# Patient Record
Sex: MALE | Race: BLACK OR AFRICAN AMERICAN | NOT HISPANIC OR LATINO | URBAN - METROPOLITAN AREA
[De-identification: names, ages, dates, MRNs, and addresses within clinical notes are randomized per-mention and may not be internally consistent; named-entity substitution may affect disease eponyms.]

---

## 2022-04-26 ENCOUNTER — EMERGENCY (EMERGENCY)
Facility: HOSPITAL | Age: 27
LOS: 1 days | Discharge: HOME | End: 2022-04-26
Admitting: EMERGENCY MEDICINE
Payer: SELF-PAY

## 2022-04-26 VITALS
RESPIRATION RATE: 20 BRPM | TEMPERATURE: 97 F | OXYGEN SATURATION: 98 % | SYSTOLIC BLOOD PRESSURE: 114 MMHG | HEART RATE: 74 BPM | DIASTOLIC BLOOD PRESSURE: 75 MMHG

## 2022-04-26 PROCEDURE — 99053 MED SERV 10PM-8AM 24 HR FAC: CPT

## 2022-04-26 PROCEDURE — 99284 EMERGENCY DEPT VISIT MOD MDM: CPT

## 2022-04-26 NOTE — ED PROVIDER NOTE - OBJECTIVE STATEMENT
25 yo M with no known PMHx, BIBA for AMS and public intoxication. Pt admits to alcohol consumption tonight, and [place]  No apparent trauma, bleeding, respiratory distress, N/V, pain, focal weakness, urinary/bowel incontinence or tremors noted. Pt is altered and unable to provide clinical information currently

## 2022-04-26 NOTE — ED ADULT NURSE NOTE - OBJECTIVE STATEMENT
Pt BIBA from train station, admits to alcohol use, denies drug use, pain or injuries. No obvious injuries noted.

## 2022-04-26 NOTE — ED PROVIDER NOTE - PATIENT PORTAL LINK FT
You can access the FollowMyHealth Patient Portal offered by [facility] by registering at the following website: [email or web address]. By joining [employer]’s FollowMyHealth portal, you will also be able to view your health information using other applications (apps) compatible with our system.

## 2022-04-26 NOTE — ED PROVIDER NOTE - NSFOLLOWUPINSTRUCTIONS_ED_ALL_ED_FT
Follow up with your primary care doctor or clinics listed below if you do not have a doctor,    [address] [address]  To make an appointment, call [phone]    [facility]  Address: [facility] [address] [place] [id number]  Appointment Center: [phone] ([phone])     [facility] is a good referral line for crisis and substance abuse help.  [facility] has drop in programs all over the city.    Return to the ER for Emergencies.  Return immediately for any new or worsening symptoms or any new concerns

## 2022-04-26 NOTE — ED PROVIDER NOTE - PHYSICAL EXAMINATION
Gen - WDWN M, +AOB, lethargic but arousable by verbal stimuli  Skin - warm, dry, intact  HEENT - AT/NC, PERRL, mild conjunctival injection, pupils 3mm b/l, TM intact with no hemotympanium b/l, no facial contusion or periorbital ecchymosis, o/p clear, uvula midline, airway patent, neck supple with no step off or midline tenderness, FROM   CV - S1S2, R/R/R  Resp - respiration non-labored, CTAB, symmetric bs b/l, no r/r/w  GI - NABS, soft, ND, NT, no rebound or guarding, no CVAT b/l  MS - moving all extremities, no c/c/e   Neuro - Lethargic but arousable by verbal stimuli, garble speech and unsteady gait

## 2022-04-29 DIAGNOSIS — R41.82 ALTERED MENTAL STATUS, UNSPECIFIED: ICD-10-CM

## 2022-04-29 DIAGNOSIS — F10.129 ALCOHOL ABUSE WITH INTOXICATION, UNSPECIFIED: ICD-10-CM

## 2023-01-16 ENCOUNTER — EMERGENCY (EMERGENCY)
Facility: HOSPITAL | Age: 28
LOS: 1 days | Discharge: HOME | End: 2023-01-16
Attending: EMERGENCY MEDICINE | Admitting: EMERGENCY MEDICINE
Payer: MEDICAID

## 2023-01-16 VITALS
SYSTOLIC BLOOD PRESSURE: 121 MMHG | WEIGHT: 184.97 LBS | RESPIRATION RATE: 18 BRPM | DIASTOLIC BLOOD PRESSURE: 72 MMHG | HEIGHT: 78 IN | TEMPERATURE: 98 F | OXYGEN SATURATION: 99 % | HEART RATE: 94 BPM

## 2023-01-16 LAB — HIV 1 & 2 AB SERPL IA.RAPID: SIGNIFICANT CHANGE UP

## 2023-01-16 PROCEDURE — 73630 X-RAY EXAM OF FOOT: CPT | Mod: 26,LT

## 2023-01-16 PROCEDURE — 99284 EMERGENCY DEPT VISIT MOD MDM: CPT

## 2023-01-16 RX ORDER — TETANUS TOXOID, REDUCED DIPHTHERIA TOXOID AND ACELLULAR PERTUSSIS VACCINE, ADSORBED 5; 2.5; 8; 8; 2.5 [IU]/.5ML; [IU]/.5ML; UG/.5ML; UG/.5ML; UG/.5ML
0.5 SUSPENSION INTRAMUSCULAR ONCE
Refills: 0 | Status: COMPLETED | OUTPATIENT
Start: 2023-01-16 | End: 2023-01-16

## 2023-01-16 RX ORDER — CEPHALEXIN 500 MG
1 CAPSULE ORAL
Qty: 28 | Refills: 0
Start: 2023-01-16 | End: 2023-01-22

## 2023-01-16 RX ORDER — IBUPROFEN 200 MG
1 TABLET ORAL
Qty: 30 | Refills: 0
Start: 2023-01-16

## 2023-01-16 RX ORDER — DOLUTEGRAVIR SODIUM 25 MG/1
1 TABLET, FILM COATED ORAL
Qty: 28 | Refills: 0
Start: 2023-01-16 | End: 2023-02-12

## 2023-01-16 RX ORDER — EMTRICITABINE AND TENOFOVIR DISOPROXIL FUMARATE 200; 300 MG/1; MG/1
1 TABLET, FILM COATED ORAL
Qty: 28 | Refills: 0
Start: 2023-01-16 | End: 2023-02-12

## 2023-01-16 RX ADMIN — TETANUS TOXOID, REDUCED DIPHTHERIA TOXOID AND ACELLULAR PERTUSSIS VACCINE, ADSORBED 0.5 MILLILITER(S): 5; 2.5; 8; 8; 2.5 SUSPENSION INTRAMUSCULAR at 14:20

## 2023-01-16 NOTE — ED PROVIDER NOTE - PROGRESS NOTE DETAILS
X-ray without evidence of radiopaque foreign body  Rapid HIV negative  Postexposure HIV meds sent to pharmacy  Keflex and Doxy sent for coverage of impetigo/possible cellulitis  DC with outpatient follow-up  Discussed indication for patient return to ED.  Patient understood.

## 2023-01-16 NOTE — ED ADULT NURSE NOTE - OBJECTIVE STATEMENT
Pt presents to ED complaining of foot pain secondary to "stepping on a needle" No apparent gait abnormalities. On assessment- AOx4, breathing even and unlabored on RA, no apparent distress, VSS in triage, able to speak in clear coherent sentences, steady gait unassisted, neuro intact with no apparent facial asymmetry, PERRLA.

## 2023-01-16 NOTE — ED ADULT TRIAGE NOTE - SOURCE OF INFORMATION
The risks, benefits and alternatives of refractive surgery have been  discussed to include possible decrease in vision, dry eye, and halos/starbursts around lights Patient

## 2023-01-16 NOTE — ED PROVIDER NOTE - OBJECTIVE STATEMENT
27-year-old male denies past medical history presents for evaluation of left foot injury this morning.  Patient states he might of stepped on a needle on the floor of shelter that punctured through his sock into his foot.  Patient states he removed the needle and does not have a foreign body sensation.  Also complaining of multiple areas of rash to anterior abdominal wall and lower extremities times a few weeks that is itchy and painful discharge.  Denies other acute complaints

## 2023-01-16 NOTE — ED PROVIDER NOTE - PATIENT PORTAL LINK FT
You can access the FollowMyHealth Patient Portal offered by [facility] by registering at the following website: [email or web address]. By joining [employer]’s FollowMyHealth portal, you will also be able to view your health information using other applications (apps) compatible with our system.

## 2023-01-16 NOTE — ED PROVIDER NOTE - PHYSICAL EXAMINATION
GENERAL: well appearing, no acute distress, poor hygiene   HEAD: atraumatic   EYES: EOMI   ENT: moist oral mucosa   CARDIAC: regular rate  RESPIRATORY: no increased work of breathing   MUSCULOSKELETAL: no deformity   NEUROLOGICAL: alert, spontaneous movement of extremities   SKIN: L foot with punctate wound to heel, no foreign bodies; Anterior abdominal wall with painful ulcerations to lower abdomen worse on the right side with serosanguineous discharge, additional similar isolated lesions to bilateral legs and lower back  PSYCHIATRIC: cooperative

## 2023-01-16 NOTE — ED PROVIDER NOTE - CLINICAL SUMMARY MEDICAL DECISION MAKING FREE TEXT BOX
27-year-old male with rash concerning for impetigo versus less likely disseminated shingles infection versus other.  Plan for culture swabs and DC with antibiotics.    Also possible needlestick so will obtain x-ray to evaluate for foreign body and offer patient HIV postexposure prophylaxis pending rapid HIV test today ED

## 2023-01-16 NOTE — ED PROVIDER NOTE - NS ED ROS FT
CONSTITUTIONAL: no fever  EYES: no eye pain   ENMT: no throat pain  CARDIOVASCULAR: no chest pain   RESPIRATORY: no shortness of breath   GASTROINTESTINAL: no abdominal pain   GENITOURINARY: no dysuria   SKIN: +rash   NEUROLOGICAL: no headache

## 2023-01-17 LAB
HSV+VZV DNA SPEC QL NAA+PROBE: SIGNIFICANT CHANGE UP
SPECIMEN SOURCE: SIGNIFICANT CHANGE UP

## 2023-01-18 LAB
-  AMPICILLIN/SULBACTAM: SIGNIFICANT CHANGE UP
-  CEFAZOLIN: SIGNIFICANT CHANGE UP
-  CLINDAMYCIN: SIGNIFICANT CHANGE UP
-  ERYTHROMYCIN: SIGNIFICANT CHANGE UP
-  GENTAMICIN: SIGNIFICANT CHANGE UP
-  OXACILLIN: SIGNIFICANT CHANGE UP
-  RIFAMPIN: SIGNIFICANT CHANGE UP
-  TETRACYCLINE: SIGNIFICANT CHANGE UP
-  TRIMETHOPRIM/SULFAMETHOXAZOLE: SIGNIFICANT CHANGE UP
-  VANCOMYCIN: SIGNIFICANT CHANGE UP
CULTURE RESULTS: SIGNIFICANT CHANGE UP
METHOD TYPE: SIGNIFICANT CHANGE UP
ORGANISM # SPEC MICROSCOPIC CNT: SIGNIFICANT CHANGE UP
ORGANISM # SPEC MICROSCOPIC CNT: SIGNIFICANT CHANGE UP
SPECIMEN SOURCE: SIGNIFICANT CHANGE UP

## 2023-01-19 DIAGNOSIS — R21 RASH AND OTHER NONSPECIFIC SKIN ERUPTION: ICD-10-CM
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
## 2023-01-19 DIAGNOSIS — S91.332A PUNCTURE WOUND WITHOUT FOREIGN BODY, LEFT FOOT, INITIAL ENCOUNTER: ICD-10-CM

## 2023-01-19 DIAGNOSIS — L98.499 NON-PRESSURE CHRONIC ULCER OF SKIN OF OTHER SITES WITH UNSPECIFIED SEVERITY: ICD-10-CM

## 2023-01-19 DIAGNOSIS — Z23 ENCOUNTER FOR IMMUNIZATION: ICD-10-CM
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
## 2023-01-19 DIAGNOSIS — R69 ILLNESS, UNSPECIFIED: ICD-10-CM

## 2023-01-20 NOTE — POST DISCHARGE NOTE - OTHER COMMUNICATION DETAILS:
Patient has positive wound culture (positive for staph.) but patient is homeless and no phone.  However, he was discharged with prescriptions for 2 antibiotics.  Unable to contact patient.

## 2023-01-21 ENCOUNTER — EMERGENCY (EMERGENCY)
Facility: HOSPITAL | Age: 28
LOS: 1 days | Discharge: HOME | End: 2023-01-21
Admitting: EMERGENCY MEDICINE
Payer: MEDICAID

## 2023-01-21 VITALS
HEIGHT: 71 IN | OXYGEN SATURATION: 100 % | WEIGHT: 214.95 LBS | DIASTOLIC BLOOD PRESSURE: 83 MMHG | RESPIRATION RATE: 18 BRPM | SYSTOLIC BLOOD PRESSURE: 136 MMHG | TEMPERATURE: 99 F | HEART RATE: 86 BPM

## 2023-01-21 VITALS
RESPIRATION RATE: 17 BRPM | SYSTOLIC BLOOD PRESSURE: 130 MMHG | DIASTOLIC BLOOD PRESSURE: 80 MMHG | OXYGEN SATURATION: 98 % | HEART RATE: 84 BPM

## 2023-01-21 LAB
ANION GAP SERPL CALC-SCNC: 6 MMOL/L — LOW (ref 9–16)
BASOPHILS # BLD AUTO: 0.04 K/UL — SIGNIFICANT CHANGE UP (ref 0–0.2)
BASOPHILS NFR BLD AUTO: 0.4 % — SIGNIFICANT CHANGE UP (ref 0–2)
BUN SERPL-MCNC: 22 MG/DL — SIGNIFICANT CHANGE UP (ref 7–23)
CALCIUM SERPL-MCNC: 8.7 MG/DL — SIGNIFICANT CHANGE UP (ref 8.5–10.5)
CHLORIDE SERPL-SCNC: 105 MMOL/L — SIGNIFICANT CHANGE UP (ref 96–108)
CO2 SERPL-SCNC: 28 MMOL/L — SIGNIFICANT CHANGE UP (ref 22–31)
CREAT SERPL-MCNC: 1.01 MG/DL — SIGNIFICANT CHANGE UP (ref 0.5–1.3)
EGFR: 104 ML/MIN/1.73M2 — SIGNIFICANT CHANGE UP
EOSINOPHIL # BLD AUTO: 0.14 K/UL — SIGNIFICANT CHANGE UP (ref 0–0.5)
EOSINOPHIL NFR BLD AUTO: 1.4 % — SIGNIFICANT CHANGE UP (ref 0–6)
GLUCOSE SERPL-MCNC: 80 MG/DL — SIGNIFICANT CHANGE UP (ref 70–99)
HCT VFR BLD CALC: 35.9 % — LOW (ref 39–50)
HGB BLD-MCNC: 11.8 G/DL — LOW (ref 13–17)
IMM GRANULOCYTES NFR BLD AUTO: 0.4 % — SIGNIFICANT CHANGE UP (ref 0–0.9)
LYMPHOCYTES # BLD AUTO: 1.22 K/UL — SIGNIFICANT CHANGE UP (ref 1–3.3)
LYMPHOCYTES # BLD AUTO: 12.2 % — LOW (ref 13–44)
MCHC RBC-ENTMCNC: 25.5 PG — LOW (ref 27–34)
MCHC RBC-ENTMCNC: 32.9 GM/DL — SIGNIFICANT CHANGE UP (ref 32–36)
MCV RBC AUTO: 77.5 FL — LOW (ref 80–100)
MONOCYTES # BLD AUTO: 0.8 K/UL — SIGNIFICANT CHANGE UP (ref 0–0.9)
MONOCYTES NFR BLD AUTO: 8 % — SIGNIFICANT CHANGE UP (ref 2–14)
NEUTROPHILS # BLD AUTO: 7.74 K/UL — HIGH (ref 1.8–7.4)
NEUTROPHILS NFR BLD AUTO: 77.6 % — HIGH (ref 43–77)
NRBC # BLD: 0 /100 WBCS — SIGNIFICANT CHANGE UP (ref 0–0)
PLATELET # BLD AUTO: 278 K/UL — SIGNIFICANT CHANGE UP (ref 150–400)
POTASSIUM SERPL-MCNC: 4.6 MMOL/L — SIGNIFICANT CHANGE UP (ref 3.5–5.3)
POTASSIUM SERPL-SCNC: 4.6 MMOL/L — SIGNIFICANT CHANGE UP (ref 3.5–5.3)
RBC # BLD: 4.63 M/UL — SIGNIFICANT CHANGE UP (ref 4.2–5.8)
RBC # FLD: 13.1 % — SIGNIFICANT CHANGE UP (ref 10.3–14.5)
SODIUM SERPL-SCNC: 139 MMOL/L — SIGNIFICANT CHANGE UP (ref 132–145)
WBC # BLD: 9.98 K/UL — SIGNIFICANT CHANGE UP (ref 3.8–10.5)
WBC # FLD AUTO: 9.98 K/UL — SIGNIFICANT CHANGE UP (ref 3.8–10.5)

## 2023-01-21 PROCEDURE — 99284 EMERGENCY DEPT VISIT MOD MDM: CPT

## 2023-01-21 PROCEDURE — 93971 EXTREMITY STUDY: CPT | Mod: 26,LT

## 2023-01-21 RX ORDER — CEPHALEXIN 500 MG
1 CAPSULE ORAL
Qty: 28 | Refills: 0
Start: 2023-01-21 | End: 2023-01-27

## 2023-01-21 RX ORDER — CEPHALEXIN 500 MG
500 CAPSULE ORAL ONCE
Refills: 0 | Status: COMPLETED | OUTPATIENT
Start: 2023-01-21 | End: 2023-01-21

## 2023-01-21 RX ADMIN — Medication 500 MILLIGRAM(S): at 13:56

## 2023-01-21 RX ADMIN — Medication 100 MILLIGRAM(S): at 13:55

## 2023-01-21 NOTE — ED PROVIDER NOTE - CLINICAL SUMMARY MEDICAL DECISION MAKING FREE TEXT BOX
27-year-old male, past medical history of asthma, presenting to the emergency department complaining of left calf and thigh cramping for the past few days, as well as painful itchy rash for the past week. Patient found to have multiple pustules noted on the skin consistent with staph.  Wound culture from a few days ago reviewed confirming staph.  Will give patient p.o. doses Doxy and Keflex and will give patient paper copies of his antibiotics in order for him to fill the prescription. Will also obtain ultrasound of the lower extremity due to calf cramping.  Motrin given for pain relief.  Dispo pending medical work-up. 27-year-old male, past medical history of asthma, presenting to the emergency department complaining of left calf and thigh cramping for the past few days, as well as painful itchy rash for the past week. Patient found to have multiple pustules noted on the skin consistent with staph.  Wound culture from a few days ago reviewed confirming staph [no MRSA].  Will give patient p.o. doses Doxy and Keflex [here] and will give patient paper copies of his antibiotics in order for him to fill the prescription. Will also obtain ultrasound of the lower extremity due to calf cramping.  Motrin given for pain relief.  Dispo pending medical work-up.    ===  neg duplex  Will DC - patient has paper copy of RXs  stable on DC.

## 2023-01-21 NOTE — ED PROVIDER NOTE - NSFOLLOWUPINSTRUCTIONS_ED_ALL_ED_FT
Staph bacteria are normal on your skin and in your nose. They do not usually cause infection. The bacteria can cause an infection if they get inside your body through a break in your skin. Usually, antibiotics are used to kill bacteria.    DISCHARGE INSTRUCTIONS:    Seek care immediately if:     •Your symptoms get worse.      Call your doctor if:   •Your symptoms return after treatment.      •You have questions and concerns about your condition or care.      Medicines: You may need the following:  •Antibiotics may help treat a bacterial infection. You may need to take antibiotics for weeks to months. Take all of your antibiotics until they are finished.      •Take your medicine as directed. Contact your healthcare provider if you think your medicine is not helping or if you have side effects. Tell your provider if you are allergic to any medicine. Keep a list of the medicines, vitamins, and herbs you take. Include the amounts, and when and why you take them. Bring the list or the pill bottles to follow-up visits. Carry your medicine list with you in case of an emergency.      •Wash your hands often. Wash your hands several times each day. Wash after you use the bathroom, change a child's diaper, and before you prepare or eat food. Use soap and water every time. Rub your soapy hands together, lacing your fingers. Wash the front and back of your hands, and in between your fingers. Use the fingers of one hand to scrub under the fingernails of the other hand. Wash for at least 20 seconds. Rinse with warm, running water for several seconds. Then dry your hands with a clean towel or paper towel. Use hand  that contains alcohol if soap and water are not available. Do not touch your eyes, nose, or mouth without washing your hands first.  Handwashing           •Do not touch sores. Do not poke or squeeze sores. This can make the infection go deeper into your tissue.      •Cover infected sores with a bandage. Make sure the sore is completely covered during activities that may cause skin to skin contact with another person. Examples include sports such as wrestling or football. Put an extra bandage on a sore that is draining fluid. This helps keep infected drainage off surfaces that others can touch.      •Do not share personal items with others. This includes washcloths, towels, uniforms, clothes, and razors.      •Do not use public pools, hot tubs, or therapy pools until your infection has healed. Your infected sore can spread the infection to another person through the water.        •Clean surfaces often. Use a disinfecting wipe, a single-use sponge, or a cloth you can wash and reuse. Use disinfecting  if you do not have wipes. You can create a disinfecting  by mixing 1 part bleach with 10 parts water. In the kitchen, clean countertops, cooking surfaces, and the fronts and insides of the microwave and refrigerator. In the bathroom, clean the toilet, the area around the toilet, the sink, the area around the sink, and faucets. Clean surfaces in the person's room, such as a desk or dresser.      •Wash dishes and silverware in a  or in hot water. Do not share unwashed dishes or silverware with anyone.      •Wash used sheets, towels, and clothes with water and laundry detergent. Put dirty laundry in the washer immediately. Put it in a plastic bag if you are not able to wash it immediately. You do no need to wash this laundry separately from other laundry. Use the warmest water possible for the type of clothing. Wash your hands after you touch dirty laundry and before you handle clean laundry. Dry laundry completely in a warm or hot dryer.      Follow up with your doctor within 2 days or as directed: You may be referred to an infectious disease specialist. You may need an exam or more tests to make sure your infection is healing. Write down your questions so you remember to ask them during your visits.

## 2023-01-21 NOTE — ED PROVIDER NOTE - PHYSICAL EXAMINATION
VITAL SIGNS: I have reviewed nursing notes and confirm.  CONSTITUTIONAL: Well-developed; well-nourished; in no acute distress.  SKIN: Multiple crops of pustules w/ overlying scabs noted on the trunk and serina LEs, no surrounding erythema, mildly tender when palpated.  HEAD: Normocephalic; atraumatic.  NECK: Supple; non tender.  CARD: S1, S2 normal; no murmurs, gallops, or rubs. Regular rate and rhythm.  RESP: No wheezes, rales or rhonchi.  ABD: Soft; non-distended; non-tender; no rebound or guarding.  EXT: +|mild L medial thigh and calf ttp, trace serina LE edema [non pitting]; DPI.  NEURO: Alert, oriented. Grossly unremarkable. HANSON, normal tone, no gross motor or sensory changes. Fluent speech.   PSYCH: Cooperative, appropriate. Mood and affect wnl.

## 2023-01-21 NOTE — ED PROVIDER NOTE - NS ED ROS FT
+rash  +leg cramping  Denies fevers, chills, nausea, vomiting, diarrhea, constipation, abdominal pain, urinary symptoms, chest pain, palpitations, shortness of breath, dyspnea on exertion, syncope/near syncope, cough/URI symptoms, headache, weakness, numbness, focal deficits, visual changes, gait or balance changes, dizziness

## 2023-01-21 NOTE — ED PROVIDER NOTE - PATIENT PORTAL LINK FT
You can access the FollowMyHealth Patient Portal offered by [facility] by registering at the following website: [email or web address]. By joining [employer]’s FollowMyHealth portal, you will also be able to view your health information using other applications (apps) compatible with our system.

## 2023-01-21 NOTE — ED ADULT TRIAGE NOTE - CHIEF COMPLAINT QUOTE
Pt walked in c/o of atraumatic left leg pain and cramps x today. Pt also reports being seen in this ED on 1/16 for a rash to the abdomen. Reports continued itching and pain. Pt states he was not able to  his medications from the pharmacy due to insurance issues.

## 2023-01-21 NOTE — ED PROVIDER NOTE - OBJECTIVE STATEMENT
27-year-old male, past medical history of asthma, presenting to the emergency department complaining of left calf and thigh cramping for the past few days, as well as painful itchy rash for the past week.  Patient was seen in the ED 3 days ago for the rash, however he states due to insurance issues, he was unable to  his antibiotic prescriptions.  Patient reports he is currently living in a shelter.  Denies fevers, chills, chest pain, history of DVT or clots, urinary symptoms, or shortness of breath.

## 2023-01-23 DIAGNOSIS — R21 RASH AND OTHER NONSPECIFIC SKIN ERUPTION: ICD-10-CM

## 2023-01-23 DIAGNOSIS — Z59.01 SHELTERED HOMELESSNESS: ICD-10-CM

## 2023-01-23 DIAGNOSIS — M79.662 PAIN IN LEFT LOWER LEG: ICD-10-CM

## 2023-01-23 DIAGNOSIS — Z91.018 ALLERGY TO OTHER FOODS: ICD-10-CM

## 2023-01-23 DIAGNOSIS — J45.909 UNSPECIFIED ASTHMA, UNCOMPLICATED: ICD-10-CM

## 2023-01-23 SDOH — ECONOMIC STABILITY - HOUSING INSECURITY: SHELTERED HOMELESSNESS: Z59.01

## 2023-01-26 ENCOUNTER — EMERGENCY (EMERGENCY)
Facility: HOSPITAL | Age: 28
LOS: 1 days | Discharge: HOME | End: 2023-01-26
Attending: EMERGENCY MEDICINE | Admitting: EMERGENCY MEDICINE
Payer: MEDICAID

## 2023-01-26 VITALS
DIASTOLIC BLOOD PRESSURE: 85 MMHG | OXYGEN SATURATION: 100 % | HEART RATE: 87 BPM | WEIGHT: 214.95 LBS | TEMPERATURE: 99 F | SYSTOLIC BLOOD PRESSURE: 133 MMHG | HEIGHT: 71 IN | RESPIRATION RATE: 18 BRPM

## 2023-01-26 DIAGNOSIS — Z91.018 ALLERGY TO OTHER FOODS: ICD-10-CM

## 2023-01-26 DIAGNOSIS — R21 RASH AND OTHER NONSPECIFIC SKIN ERUPTION: ICD-10-CM

## 2023-01-26 DIAGNOSIS — J45.909 UNSPECIFIED ASTHMA, UNCOMPLICATED: ICD-10-CM

## 2023-01-26 PROCEDURE — 99284 EMERGENCY DEPT VISIT MOD MDM: CPT

## 2023-01-26 RX ORDER — CEPHALEXIN 500 MG
500 CAPSULE ORAL ONCE
Refills: 0 | Status: COMPLETED | OUTPATIENT
Start: 2023-01-26 | End: 2023-01-26

## 2023-01-26 RX ADMIN — Medication 500 MILLIGRAM(S): at 18:23

## 2023-01-26 RX ADMIN — Medication 100 MILLIGRAM(S): at 18:22

## 2023-01-26 NOTE — ED PROVIDER NOTE - PROGRESS NOTE DETAILS
Patient was re-evaluated and doing well. Return precautions and follow up were discussed. Patient verbalized understanding.

## 2023-01-26 NOTE — ED PROVIDER NOTE - OBJECTIVE STATEMENT
Patient is a 27-year-old male with a history of asthma who presents to the ED with a rash.  Patient was seen in the ED twice before, January 16 and January 21.  At those visits he was prescribed Keflex and Doxy for a MRSA skin infection, but patient was unable to  his medications due to insurance issues.  Patient says he will now be able to pick them up tomorrow, but in the meantime his rash has been getting worse and he went to come to the ED for a dose of medication.  He endorses subjective chills, no nausea or vomiting, shortness of breath, chest pain, abdominal pain, dysuria, hematuria, diarrhea.  Rash is painful, mildly itchy, bleeds when scratched.  Is on his right greater than left leg, abdomen, back.  Denies IV drug use or any other substance use. Is undomiciled, staying at a shelter

## 2023-01-26 NOTE — ED PROVIDER NOTE - CLINICAL SUMMARY MEDICAL DECISION MAKING FREE TEXT BOX
Casi - Patient is a 27-year-old male with a history of asthma who presents to the ED with a rash. Concern for MRSA skin infection. will give dose of abx in ED, pt says he can  prior prescription tomorrow

## 2023-01-26 NOTE — ED PROVIDER NOTE - NS ED ROS FT
GENERAL: No fever, + chills  EYES: no vision changes, no discharge.   ENT: no difficulty swallowing or speaking   CARDIAC: no chest pain/pressure, SOB, lower extremity swelling  PULMONARY: no cough, SOB  GI: no abdominal pain, n/v/d  : no dysuria  SKIN: +rashes  NEURO: no headache, lightheadedness, paresthesia  MSK: No joint pain, myalgia, weakness.

## 2023-01-26 NOTE — ED PROVIDER NOTE - ANTIBIOTIC MEDICATION DECIDED AGAINST BECAUSE
We considered giving a new antibiotic prescription; however, we confirmed that he does have antibiotics waiting for him at his pharmacy that he can  tomorrow.

## 2023-01-26 NOTE — ED ADULT TRIAGE NOTE - CHIEF COMPLAINT QUOTE
pt. diagnosed with staph infection 5 days ago but has had complications filling his antibiotics pt. reports infection is worse and draining a lot.

## 2023-01-26 NOTE — ED PROVIDER NOTE - ATTENDING CONTRIBUTION TO CARE
Pt is a pleasant 27 yr old male who was seen and examined with the resident.  Pt is c/o rash.  Was seen here on Jan 16 and 21.  No DVT and bloodwork OK.  Was prescribed doxycycline and Keflex on last ED visit but has not yet started his abx because was transferred to a pharmacy that was too far but he will be able to  his abx tomorrow.  On our exam, no fever; normal pulse.  Skin warm and dry. 2cm crusted lesions scattered over R>L thigh, R abd, and 2 lesions on L back. tender to palpation.  Impression is rash--likely bacterial in etiology.  Will give PO abx now and then d/c patient.   also confirmed that pt can get his abx tomorrow.

## 2023-01-26 NOTE — ED PROVIDER NOTE - PHYSICAL EXAMINATION
Felecia Lance MD  GENERAL: Patient awake alert NAD.  HEENT: NC/AT, Moist mucous membranes, EOMI.  LUNGS: normal work of breathing  EXT: No edema. No calf tenderness.  MSK: No spinal tenderness, no pain with movement, no deformities.  NEURO: A&Ox3. Moving all extremities.  SKIN: Warm and dry. 2cm crusted lesions scattered over R>L thigh, R abd, and 2 lesions on L back. tender to palpation  PSYCH: Normal affect.

## 2023-01-26 NOTE — ED PROVIDER NOTE - PATIENT PORTAL LINK FT
You can access the FollowMyHealth Patient Portal offered by [facility] by registering at the following website: [email or web address]. By joining [employer]’s FollowMyHealth portal, you will also be able to view your health information using other applications (apps) compatible with our system.

## 2023-01-27 NOTE — ED ADULT NURSE NOTE - NS ED NURSE RECORD ANOTHER HT AND WT
Transition of Care Plan  RUR- 17%  DISPOSITION: [facility] [address]; pending medical progression and when medically stable  SLP consulted. Gastroenterology consulted and following. F/U with PCP/Specialist    Transport: S    CM will continue to follow and assist with MAE needs as they arise. Reason for Readmission:   Dysphagia           RUR Score/Risk Level:   17%      PCP: YES First and Last name:  Laura Betancourt .[phone]   Name of Practice: Followed at [address]    Are you a current patient: Yes/No: YES   Approximate date of last visit: Unknown   Can you participate in a virtual visit with your PCP: No    Is a Care Conference indicated:  Not at this time. Did you attend your follow up appointment (s): If not, why not: YES         Resources/supports as identified by patient/family:   Family       Top Challenges facing patient (as identified by patient/family and CM):     Current health challenges    Finances/Medication cost?   [employer] as source of income. No significant financial stressors   Transportation      S  Support system or lack thereof? Family     Living arrangements? [address] [facility]     Self-care/ADLs/Cognition? AOx3. Bed Bound. Requires andry lift to transfer at facility. Wears O2 as needed at facility. Current Advanced Directive/Advance Care Plan:  Full Code/ Has ACP docs. Daughter: Primary Decision Wilver Arambula [phone]           Plan for utilizing home health:  Not at this time, LTC. Patient/family seen: YES       Informed patient/family of BPCI-A Bundle Program. Also advised of potential outreach by Care Transitions Team.    Bundle Payment Care Improvement Beneficiary Letter Delivered to Beneficiary or Representative:YES. Date BCPI -A was given:1/27/23    Care Management Interventions  PCP Verified by CM:  Yes  Palliative Care Criteria Met (RRAT>21 & CHF Dx)?: No  Mode of Transport at Discharge: S  Transition of Yes Care Consult (CM Consult): Discharge Planning  Discharge Durable Medical Equipment: No  Physical Therapy Consult: No  Occupational Therapy Consult: No  Speech Therapy Consult: Yes  Support Systems: Child(baylee), Other Family Member(s)  Confirm Follow Up Transport: Other (see comment) (BLS)  Discharge Location  Patient Expects to be Discharged to[de-identified] Skilled nursing facility    Readmission Assessment  Number of days since last admission?: 1-7 days  [facility]  Who is being interviewed?: Patient, Caregiver  What was the patient's/caregiver's perception as to why they think they needed to return back to the hospital?: Other (Comment)  Did you visit your Primary Care Physician after you left the hospital, before you returned this time?: Yes  Did you see a specialist, such as Cardiac, Pulmonary, Orthopedic Physician, etc. after you left the hospital?: No  Who advised the patient to return to the hospital?: Skilled Unit  Does the patient report anything that got in the way of taking their medications?: No  In our efforts to provide the best possible care to you and others like you, can you think of anything that we could have done to help you after you left the hospital the first time, so that you might not have needed to return so soon?: Additional Community resources available for illness support, Other (Comment)    YEISON Ruff/[facility]  Care Management  3:13 PM         .

## 2023-06-12 ENCOUNTER — EMERGENCY (EMERGENCY)
Facility: HOSPITAL | Age: 28
LOS: 1 days | Discharge: HOME | End: 2023-06-12
Attending: EMERGENCY MEDICINE | Admitting: EMERGENCY MEDICINE
Payer: MEDICAID

## 2023-06-12 VITALS
OXYGEN SATURATION: 97 % | RESPIRATION RATE: 18 BRPM | SYSTOLIC BLOOD PRESSURE: 105 MMHG | DIASTOLIC BLOOD PRESSURE: 78 MMHG | HEART RATE: 79 BPM | TEMPERATURE: 98 F | WEIGHT: 214.95 LBS | HEIGHT: 71 IN

## 2023-06-12 DIAGNOSIS — M79.672 PAIN IN LEFT FOOT: ICD-10-CM

## 2023-06-12 DIAGNOSIS — L73.9 FOLLICULAR DISORDER, UNSPECIFIED: ICD-10-CM

## 2023-06-12 DIAGNOSIS — Z91.010 ALLERGY TO PEANUTS: ICD-10-CM

## 2023-06-12 DIAGNOSIS — L84 CORNS AND CALLOSITIES: ICD-10-CM

## 2023-06-12 DIAGNOSIS — Z59.00 HOMELESSNESS UNSPECIFIED: ICD-10-CM

## 2023-06-12 PROCEDURE — 99283 EMERGENCY DEPT VISIT LOW MDM: CPT

## 2023-06-12 RX ORDER — BACITRACIN ZINC 500 UNIT/G
1 OINTMENT IN PACKET (EA) TOPICAL
Qty: 1 | Refills: 0
Start: 2023-06-12 | End: 2023-06-16

## 2023-06-12 RX ORDER — HYDROCORTISONE 1 %
1 OINTMENT (GRAM) TOPICAL
Qty: 1 | Refills: 0
Start: 2023-06-12 | End: 2023-06-18

## 2023-06-12 RX ORDER — BACITRACIN ZINC 500 UNIT/G
1 OINTMENT IN PACKET (EA) TOPICAL ONCE
Refills: 0 | Status: COMPLETED | OUTPATIENT
Start: 2023-06-12 | End: 2023-06-12

## 2023-06-12 RX ORDER — IBUPROFEN 200 MG
600 TABLET ORAL ONCE
Refills: 0 | Status: COMPLETED | OUTPATIENT
Start: 2023-06-12 | End: 2023-06-12

## 2023-06-12 SDOH — ECONOMIC STABILITY - HOUSING INSECURITY: HOMELESSNESS UNSPECIFIED: Z59.00

## 2023-06-12 NOTE — ED PROVIDER NOTE - LOCATION
Left fifth lateral metatarsal with superficial dried cracked callus.  No surrounding erythema or edema/foot

## 2023-06-12 NOTE — ED ADULT NURSE NOTE - NSFALLUNIVINTERV_ED_ALL_ED
Bed/Stretcher in lowest position, wheels locked, appropriate side rails in place/Call bell, personal items and telephone in reach/Instruct patient to call for assistance before getting out of bed/chair/stretcher/Non-slip footwear applied when patient is off stretcher/Delight to call system/Physically safe environment - no spills, clutter or unnecessary equipment/Purposeful proactive rounding/Room/bathroom lighting operational, light cord in reach

## 2023-06-12 NOTE — ED PROVIDER NOTE - OBJECTIVE STATEMENT
27-year-old male with no significant past medical history, undomiciled, who presents with left foot pain from a callus, and itchy dry skin to the front of the abdomen for several weeks.  Patient has not tried anything for the symptoms.  Denies fever or chills, denies trauma, denies falls.  Denies numbness weakness paresthesias.

## 2023-06-12 NOTE — ED PROVIDER NOTE - NSFOLLOWUPCLINICS_GEN_ALL_ED_FT
[facility]  Podiatry  [address] [place] [address]  Phone: [phone]  Fax:

## 2023-06-12 NOTE — ED PROVIDER NOTE - PATIENT PORTAL LINK FT
You can access the FollowMyHealth Patient Portal offered by Health system by registering at the following website: [email or web address]. By joining [employer]’s FollowMyHealth portal, you will also be able to view your health information using other applications (apps) compatible with our system.

## 2023-06-12 NOTE — ED PROVIDER NOTE - CLINICAL SUMMARY MEDICAL DECISION MAKING FREE TEXT BOX
Patient undomiciled otherwise healthy per patient unknown past medical history who presents with callus to the left foot which I will give podiatry referral otherwise no signs of foot cellulitis or deformity, also with mild folliculitis anterior abdominal wall likely from wearing fitted clothing I discussed this with patient he will apply bacitracin and hydrocortisone and advised to watch for signs of worsening erythema or cellulitis he agrees with the plan

## 2023-06-12 NOTE — ED PROVIDER NOTE - ABDOMINAL EXAM
superficial scab excoriation, to anterior mid abdominal wall, with no vesicles, no induration or oozing, otherwise dry. Consistent with folliculitisNo streaking no erythema/soft/nondistended

## 2023-06-12 NOTE — ED PROVIDER NOTE - NSFOLLOWUPINSTRUCTIONS_ED_ALL_ED_FT
Folliculitis  A normal hair follicle compared to one that is infected. The infected follicle is swollen and contains pus.  Folliculitis is inflammation of the hair follicles. Folliculitis most commonly occurs on the scalp, thighs, legs, back, and buttocks. However, it can occur anywhere on the body.    What are the causes?  This condition may be caused by:  A bacterial infection (common).  A fungal infection.  A viral infection.  Contact with certain chemicals, especially oils and tars.  Shaving or waxing.  Greasy ointments or creams applied to the skin.  Long-lasting folliculitis and folliculitis that keeps coming back may be caused by bacteria. This bacteria can live anywhere on your skin and is often found in the nostrils.    What increases the risk?  You are more likely to develop this condition if you have:  A weakened immune system.  Diabetes.  Obesity.  What are the signs or symptoms?  Symptoms of this condition include:  Redness.  Soreness.  Swelling.  Itching.  Small white or yellow, pus-filled, itchy spots (pustules) that appear over a reddened area. If there is an infection that goes deep into the follicle, these may develop into a boil (furuncle).  A group of closely packed boils (carbuncle). These tend to form in hairy, sweaty areas of the body.  How is this diagnosed?  This condition is diagnosed with a skin exam. To find what is causing the condition, your health care provider may take a sample of one of the pustules or boils for testing in a lab.    How is this treated?  This condition may be treated by:  Applying warm compresses to the affected areas.  Taking an antibiotic medicine or applying an antibiotic medicine to the skin.  Applying or bathing with an antiseptic solution.  Taking an over-the-counter medicine to help with itching.  Having a procedure to drain any pustules or boils. This may be done if a pustule or boil contains a lot of pus or fluid.  Having laser hair removal. This may be done to treat long-lasting folliculitis.  Follow these instructions at home:  Managing pain and swelling    A heating pad for use on the affected area.  If directed, apply heat to the affected area as often as told by your health care provider. Use the heat source that your health care provider recommends, such as a moist heat pack or a heating pad.  Place a towel between your skin and the heat source.  Leave the heat on for 20–30 minutes.  Remove the heat if your skin turns bright red. This is especially important if you are unable to feel pain, heat, or cold. You may have a greater risk of getting burned.  General instructions    If you were prescribed an antibiotic medicine, take it or apply it as told by your health care provider. Do not stop using the antibiotic even if your condition improves.  Check the irritated area every day for signs of infection. Check for:  Redness, swelling, or pain.  Fluid or blood.  Warmth.  Pus or a bad smell.  Do not shave irritated skin.  Take over-the-counter and prescription medicines only as told by your health care provider.  Keep all follow-up visits as told by your health care provider. This is important.  Get help right away if:  You have more redness, swelling, or pain in the affected area.  Red streaks are spreading from the affected area.  You have a fever.  Summary  Folliculitis is inflammation of the hair follicles. Folliculitis most commonly occurs on the scalp, thighs, legs, back, and buttocks.  This condition may be treated by taking an antibiotic medicine or applying an antibiotic medicine to the skin, and applying or bathing with an antiseptic solution.  If you were prescribed an antibiotic medicine, take it or apply it as told by your health care provider. Do not stop using the antibiotic even if your condition improves.  Get help right away if you have new or worsening symptoms.  Keep all follow-up visits as told by your health care provider. This is important.  This information is not intended to replace advice given to you by your health care provider. Make sure you discuss any questions you have with your health care provider.

## 2023-06-13 VITALS
DIASTOLIC BLOOD PRESSURE: 75 MMHG | RESPIRATION RATE: 18 BRPM | SYSTOLIC BLOOD PRESSURE: 111 MMHG | OXYGEN SATURATION: 96 % | TEMPERATURE: 98 F | HEART RATE: 75 BPM

## 2023-06-13 PROBLEM — Z78.9 OTHER SPECIFIED HEALTH STATUS: Chronic | Status: ACTIVE | Noted: 2022-04-26

## 2023-06-13 RX ADMIN — Medication 600 MILLIGRAM(S): at 00:04

## 2023-06-13 RX ADMIN — Medication 1 APPLICATION(S): at 00:05

## 2023-11-21 NOTE — ED ADULT TRIAGE NOTE - TEMPERATURE IN CELSIUS (DEGREES C)
What Type Of Note Output Would You Prefer (Optional)?: Bullet Format What Is The Reason For Today's Visit?: Full Body Skin Examination What Is The Reason For Today's Visit? (Being Monitored For X): the development of new lesions 37.1

## 2023-12-03 ENCOUNTER — EMERGENCY (EMERGENCY)
Facility: HOSPITAL | Age: 28
LOS: 1 days | Discharge: HOME | End: 2023-12-03
Admitting: EMERGENCY MEDICINE
Payer: MEDICAID

## 2023-12-03 VITALS
SYSTOLIC BLOOD PRESSURE: 129 MMHG | RESPIRATION RATE: 15 BRPM | OXYGEN SATURATION: 99 % | DIASTOLIC BLOOD PRESSURE: 75 MMHG | HEART RATE: 92 BPM | TEMPERATURE: 98 F

## 2023-12-03 VITALS
SYSTOLIC BLOOD PRESSURE: 119 MMHG | HEART RATE: 77 BPM | OXYGEN SATURATION: 100 % | TEMPERATURE: 98 F | DIASTOLIC BLOOD PRESSURE: 80 MMHG | RESPIRATION RATE: 16 BRPM

## 2023-12-03 PROBLEM — J45.909 UNSPECIFIED ASTHMA, UNCOMPLICATED: Chronic | Status: ACTIVE | Noted: 2023-01-21

## 2023-12-03 PROCEDURE — 70486 CT MAXILLOFACIAL W/O DYE: CPT | Mod: 26

## 2023-12-03 PROCEDURE — 72125 CT NECK SPINE W/O DYE: CPT | Mod: 26

## 2023-12-03 PROCEDURE — 99284 EMERGENCY DEPT VISIT MOD MDM: CPT

## 2023-12-03 PROCEDURE — 70450 CT HEAD/BRAIN W/O DYE: CPT | Mod: 26

## 2023-12-03 PROCEDURE — 73030 X-RAY EXAM OF SHOULDER: CPT | Mod: 26,LT
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
## 2023-12-03 NOTE — ED ADULT NURSE NOTE - CAS EDP DISCH TYPE
Home O-Z Plasty Text: The defect edges were debeveled with a #15 scalpel blade.  Given the location of the defect, shape of the defect and the proximity to free margins an O-Z plasty (double transposition flap) was deemed most appropriate.  Using a sterile surgical marker, the appropriate transposition flaps were drawn incorporating the defect and placing the expected incisions within the relaxed skin tension lines where possible.    The area thus outlined was incised deep to adipose tissue with a #15 scalpel blade.  The skin margins were undermined to an appropriate distance in all directions utilizing iris scissors.  Hemostasis was achieved with electrocautery.  The flaps were then transposed into place, one clockwise and the other counterclockwise, and anchored with interrupted buried subcutaneous sutures. Jasbir
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
## 2023-12-03 NOTE — ED PROVIDER NOTE - PHYSICAL EXAMINATION
General: well developed, well nourished, no distress  Eye: bilateral: PERRL, EOMI  Ears, Nose, Throat: normal pharynx, TMs normal, membranes moist.  Neck: non-tender, full range of motion, supple.  Negative For: lymphadenopathy (R), lymphadenopathy (L)  Respiratory: CTAB.  Cardiovascular: S1-S2 normal, regular rate, regular rhythm.  Abdomen: normal bowel sounds, non tender, soft.    Genitourinary: Negative For: CVA tenderness  Musculoskeletal: normal gait.  Negative For: back pain, upper, back pain  Extremities: normal range of motion, non-tender.  Negative For: edema (R), edema (L), calf tenderness (R), calf tenderness (L), swelling  Extremity Strength: upper extremities equal bilateral: 5/5, lower extremities equal bilateral: 5/5  Neurologic: alert, oriented to person, oriented to place, oriented to time.    Skin: normal color.  Negative For: rash  Psychiatric: normal affect, normal insight, normal concentration  Bleeding appreciated on the L temporal region.  Head is normocephalic. Pt is neurologically intact, no focal neurologic deficits. GCS = 15. no raccoon eyes/rich signs/hemotympanum noted. No focal tenderness on spinous processes of C-spine.
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
## 2023-12-03 NOTE — ED ADULT NURSE NOTE - NSFALLUNIVINTERV_ED_ALL_ED
Bed/Stretcher in lowest position, wheels locked, appropriate side rails in place/Call bell, personal items and telephone in reach/Instruct patient to call for assistance before getting out of bed/chair/stretcher/Non-slip footwear applied when patient is off stretcher/[place] to call system/Physically safe environment - no spills, clutter or unnecessary equipment/Purposeful proactive rounding/Room/bathroom lighting operational, light cord in reach Bed/Stretcher in lowest position, wheels locked, appropriate side rails in place/Call bell, personal items and telephone in reach/Instruct patient to call for assistance before getting out of bed/chair/stretcher/Non-slip footwear applied when patient is off stretcher/[place] to call system/Physically safe environment - no spills, clutter or unnecessary equipment/Purposeful proactive rounding/Room/bathroom lighting operational, light cord in reach Bed/Stretcher in lowest position, wheels locked, appropriate side rails in place/Call bell, personal items and telephone in reach/Instruct patient to call for assistance before getting out of bed/chair/stretcher/Non-slip footwear applied when patient is off stretcher/[place] to call system/Physically safe environment - no spills, clutter or unnecessary equipment/Purposeful proactive rounding/Room/bathroom lighting operational, light cord in reach
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
## 2023-12-03 NOTE — ED PROVIDER NOTE - NSFOLLOWUPINSTRUCTIONS_ED_ALL_ED_FT
Head Injury    WHAT YOU NEED TO KNOW:    A head injury can include your scalp, face, skull, or brain and range from mild to severe. Effects can appear immediately after the injury or develop later. The effects may last a short time or be permanent. Healthcare providers may want to check your recovery over time. Treatment may change as you recover or develop new health problems from the head injury.    DISCHARGE INSTRUCTIONS:    Call your local emergency number (911 in the [place]), or have someone else call if:   •You cannot be woken.      •You have a seizure.      •You stop responding to others or you faint.      •You have blurry or double vision.      •Your speech becomes slurred or confused.      •You have arm or leg weakness, loss of feeling, or new problems with coordination.      •Your pupils are larger than usual, or one pupil is a different size than the other.      •You have blood or clear fluid coming out of your ears or nose.      Return to the emergency department if:   •You have repeated or forceful vomiting.      •You feel confused.      •Your headache gets worse or becomes severe.      •You or someone caring for you notices that you are harder to wake than usual.      Call your doctor if:   •Your symptoms last longer than 6 weeks after the injury.      •You have questions or concerns about your condition or care.      Medicines:   •Acetaminophen decreases pain and fever. It is available without a doctor's order. Ask how much to take and how often to take it. Follow directions. Read the labels of all other medicines you are using to see if they also contain acetaminophen, or ask your doctor or pharmacist. Acetaminophen can cause liver damage if not taken correctly.      •Take your medicine as directed. Contact your healthcare provider if you think your medicine is not helping or if you have side effects. Tell your provider if you are allergic to any medicine. Keep a list of the medicines, vitamins, and herbs you take. Include the amounts, and when and why you take them. Bring the list or the pill bottles to follow-up visits. Carry your medicine list with you in case of an emergency.      Self-care:   •Rest or do quiet activities. Limit your time watching TV, using the computer, or doing tasks that require a lot of thinking. Slowly return to your normal activities as directed. Do not play sports or do activities that may cause you to get hit in the head. Ask your healthcare provider when you can return to sports.      •Apply ice on your head for 15 to 20 minutes every hour or as directed. Use an ice pack, or put crushed ice in a plastic bag. Cover it with a towel before you apply it to your skin. Ice helps prevent tissue damage and decreases swelling and pain.      •Have someone stay with you for 24 hours , or as directed. This person can monitor you for problems and call for help if needed. When you are awake, the person should ask you a few questions every few hours to see if you are thinking clearly. An example is to ask your name or address.      Prevent another head injury:   •Wear a helmet that fits properly. Do this when you play sports, or ride a bike, scooter, or skateboard. Helmets help decrease your risk for a serious head injury. Talk to your healthcare provider about other ways you can protect yourself if you play sports.      •Wear your seatbelt every time you are in a car. This helps lower your risk for a head injury if you are in a car accident.      Follow up with your doctor as directed: Write down your questions so you remember to ask them during your visits.       © [employer] 1973, 2023

## 2023-12-03 NOTE — ED PROVIDER NOTE - PATIENT PORTAL LINK FT
You can access the FollowMyHealth Patient Portal offered by [facility] by registering at the following website: [email or web address]. By joining [employer]’s FollowMyHealth portal, you will also be able to view your health information using other applications (apps) compatible with our system. You can access the FollowMyHealth Patient Portal offered by [facility] by registering at the following website: [email or web address]. By joining [employer]’s FollowMyHealth portal, you will also be able to view your health information using other applications (apps) compatible with our system. You can access the FollowMyHealth Patient Portal offered by [facility] by registering at the following website: [email or web address]. By joining [employer]’s FollowMyHealth portal, you will also be able to view your health information using other applications (apps) compatible with our system.

## 2023-12-03 NOTE — ED PROCEDURE NOTE - CPROC ED TIME OUT STATEMENT1
“Patient's name, , procedure and correct site were confirmed during the [place] Timeout.” “Patient's name, , procedure and correct site were confirmed during the [place] Timeout.” “Patient's name, , procedure and correct site were confirmed during the [place] Timeout.”
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
## 2023-12-05 DIAGNOSIS — M25.512 PAIN IN LEFT SHOULDER: ICD-10-CM
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
## 2023-12-05 DIAGNOSIS — S01.01XA LACERATION WITHOUT FOREIGN BODY OF SCALP, INITIAL ENCOUNTER: ICD-10-CM

## 2023-12-05 DIAGNOSIS — S09.90XA UNSPECIFIED INJURY OF HEAD, INITIAL ENCOUNTER: ICD-10-CM

## 2023-12-05 DIAGNOSIS — Z91.010 ALLERGY TO PEANUTS: ICD-10-CM

## 2023-12-05 DIAGNOSIS — Z91.018 ALLERGY TO OTHER FOODS: ICD-10-CM

## 2024-08-06 NOTE — ED PROVIDER NOTE - NSICDXPASTSURGICALHX_GEN_ALL_CORE_FT
"Subjective   Patient ID: Julia Mcguire is a 63 y.o. female who presents for Follow-up (3 month).  HPI  Patient is here today for 3 mo follow up     Pt fell into her kitchen counter and fractured a few ribs.     Pt complains that she is really struggling with weight gain and trying to loose weight, increased joint pain.     Pt continues  to have diarrhea, cannot correlated with diet. Patients last colonoscopy was in 2013.     Review of Systems   Constitutional:  Negative for activity change, appetite change, chills and fatigue.   HENT:  Negative for congestion, postnasal drip, sinus pressure, sinus pain and sore throat.    Respiratory:  Negative for cough, shortness of breath and wheezing.    Cardiovascular:  Negative for chest pain and leg swelling.   Gastrointestinal:  Positive for diarrhea. Negative for abdominal distention, nausea and vomiting.   Musculoskeletal:  Positive for arthralgias, back pain and neck pain.   Neurological:  Negative for weakness and numbness.       Objective   /84   Pulse 74   Ht 1.575 m (5' 2\")   Wt 76.2 kg (168 lb)   BMI 30.73 kg/m²     Physical Exam  Constitutional:       General: She is not in acute distress.     Appearance: Normal appearance.   HENT:      Head: Normocephalic.      Nose: Nose normal.      Mouth/Throat:      Pharynx: No oropharyngeal exudate.   Eyes:      General:         Right eye: No discharge.         Left eye: No discharge.      Extraocular Movements: Extraocular movements intact.      Pupils: Pupils are equal, round, and reactive to light.   Cardiovascular:      Rate and Rhythm: Normal rate and regular rhythm.      Heart sounds: No murmur heard.     No gallop.   Pulmonary:      Effort: Pulmonary effort is normal. No respiratory distress.      Breath sounds: Normal breath sounds. No wheezing.   Musculoskeletal:         General: No swelling. Normal range of motion.   Skin:     General: Skin is warm and dry.      Coloration: Skin is not jaundiced. "   Neurological:      General: No focal deficit present.      Mental Status: She is alert and oriented to person, place, and time.      Cranial Nerves: No cranial nerve deficit.   Psychiatric:         Mood and Affect: Mood normal.         Behavior: Behavior normal.       Colon cancer 2013, will order   Mammo 2022, ordered   DEXA --   Pap     Flu shot 2023  COVID received   PNA --   Shingles recommended    RSV recommended     Assessment/Plan   Problem List Items Addressed This Visit       Anxiety    Relevant Medications    FLUoxetine (PROzac) 40 mg capsule    Benign essential hypertension    Relevant Medications    losartan (Cozaar) 50 mg tablet    Cervical myelopathy (Multi)    Relevant Medications    gabapentin (Neurontin) 300 mg capsule    Hypothyroidism    Relevant Medications    levothyroxine (Synthroid, Levoxyl) 88 mcg tablet    Fibromyalgia    Relevant Medications    gabapentin (Neurontin) 300 mg capsule     Other Visit Diagnoses       Arthralgia, unspecified joint    -  Primary    Relevant Orders    Referral to Rheumatology    Colon cancer screening        Relevant Orders    Referral to Gastroenterology    Screening mammogram for breast cancer        Relevant Orders    BI mammo bilateral screening tomosynthesis    Obesity (BMI 30-39.9)        Relevant Medications    buPROPion XL (Wellbutrin XL) 150 mg 24 hr tablet          HTN, HLD  - had cardiac cath   - had echocardiogram, EF 65-70%, impaired relaxation patter of left vetricular diastolic filling, bubble study positive for  right to left shunt  - following with cardiology   - on losartan 50-100mg po daily with hydrochlorothiazide   - on lipitor 80mg po daily      2. Cervical spinal stenosis, chronic radiculopathy   - following with neurosurgery and pain management    - off tramadol, gabapentin      3. Anxiety   - does think prozac has helped   - tried zoloft, wellbutrin, buspar, savella, vistaril    - still having stress with son with intermittent drug use      4. Obesity, bmi 30   - will try wellbutrin 150mg po daily     5. Worsening arthritis   - post estefany with increasing titer, CRISSY panel negative   CRP high, recommend seeing rheum for eval to rule out any progressive auto-immune types of arthritis that would need different treatment   Final diagnoses:   [M79.7] Fibromyalgia   [G95.9] Cervical myelopathy (Multi)   [I10] Benign essential hypertension   [E03.9] Acquired hypothyroidism   [F41.9] Anxiety   [M25.50] Arthralgia, unspecified joint   [Z12.11] Colon cancer screening   [Z12.31] Screening mammogram for breast cancer   [E66.9] Obesity (BMI 30-39.9)      PAST SURGICAL HISTORY:  No significant past surgical history
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
## 2025-02-02 ENCOUNTER — EMERGENCY (EMERGENCY)
Facility: HOSPITAL | Age: 30
LOS: 1 days | Discharge: HOME | End: 2025-02-02
Admitting: EMERGENCY MEDICINE
Payer: MEDICAID

## 2025-02-02 VITALS
RESPIRATION RATE: 18 BRPM | HEART RATE: 80 BPM | DIASTOLIC BLOOD PRESSURE: 70 MMHG | OXYGEN SATURATION: 98 % | SYSTOLIC BLOOD PRESSURE: 117 MMHG | TEMPERATURE: 98 F

## 2025-02-02 VITALS
HEART RATE: 85 BPM | DIASTOLIC BLOOD PRESSURE: 68 MMHG | SYSTOLIC BLOOD PRESSURE: 137 MMHG | OXYGEN SATURATION: 96 % | TEMPERATURE: 98 F | RESPIRATION RATE: 16 BRPM
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
## 2025-02-02 DIAGNOSIS — Z91.018 ALLERGY TO OTHER FOODS: ICD-10-CM

## 2025-02-02 DIAGNOSIS — Z91.010 ALLERGY TO PEANUTS: ICD-10-CM

## 2025-02-02 DIAGNOSIS — S51.041A: ICD-10-CM
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
## 2025-02-02 LAB
ALBUMIN SERPL ELPH-MCNC: 3.8 G/DL — SIGNIFICANT CHANGE UP (ref 3.4–5)
ALP SERPL-CCNC: 76 U/L — SIGNIFICANT CHANGE UP (ref 40–120)
ALT FLD-CCNC: 27 U/L — SIGNIFICANT CHANGE UP (ref 12–42)
ANION GAP SERPL CALC-SCNC: 8 MMOL/L — LOW (ref 9–16)
AST SERPL-CCNC: 27 U/L — SIGNIFICANT CHANGE UP (ref 15–37)
BASOPHILS # BLD AUTO: 0.06 K/UL — SIGNIFICANT CHANGE UP (ref 0–0.2)
BASOPHILS NFR BLD AUTO: 1.4 % — SIGNIFICANT CHANGE UP (ref 0–2)
BILIRUB SERPL-MCNC: 0.2 MG/DL — SIGNIFICANT CHANGE UP (ref 0.2–1.2)
BUN SERPL-MCNC: 18 MG/DL — SIGNIFICANT CHANGE UP (ref 7–23)
CALCIUM SERPL-MCNC: 9.3 MG/DL — SIGNIFICANT CHANGE UP (ref 8.5–10.5)
CHLORIDE SERPL-SCNC: 106 MMOL/L — SIGNIFICANT CHANGE UP (ref 96–108)
CO2 SERPL-SCNC: 29 MMOL/L — SIGNIFICANT CHANGE UP (ref 22–31)
CREAT SERPL-MCNC: 1.08 MG/DL — SIGNIFICANT CHANGE UP (ref 0.5–1.3)
EGFR: 95 ML/MIN/1.73M2 — SIGNIFICANT CHANGE UP
EOSINOPHIL # BLD AUTO: 0.16 K/UL — SIGNIFICANT CHANGE UP (ref 0–0.5)
EOSINOPHIL NFR BLD AUTO: 3.7 % — SIGNIFICANT CHANGE UP (ref 0–6)
GLUCOSE SERPL-MCNC: 93 MG/DL — SIGNIFICANT CHANGE UP (ref 70–99)
HCT VFR BLD CALC: 41.2 % — SIGNIFICANT CHANGE UP (ref 39–50)
HGB BLD-MCNC: 13.7 G/DL — SIGNIFICANT CHANGE UP (ref 13–17)
HIV 1 & 2 AB SERPL IA.RAPID: SIGNIFICANT CHANGE UP
IMM GRANULOCYTES NFR BLD AUTO: 0.2 % — SIGNIFICANT CHANGE UP (ref 0–0.9)
LYMPHOCYTES # BLD AUTO: 1.65 K/UL — SIGNIFICANT CHANGE UP (ref 1–3.3)
LYMPHOCYTES # BLD AUTO: 38.3 % — SIGNIFICANT CHANGE UP (ref 13–44)
MCHC RBC-ENTMCNC: 25.5 PG — LOW (ref 27–34)
MCHC RBC-ENTMCNC: 33.3 G/DL — SIGNIFICANT CHANGE UP (ref 32–36)
MCV RBC AUTO: 76.7 FL — LOW (ref 80–100)
MONOCYTES # BLD AUTO: 0.39 K/UL — SIGNIFICANT CHANGE UP (ref 0–0.9)
MONOCYTES NFR BLD AUTO: 9 % — SIGNIFICANT CHANGE UP (ref 2–14)
NEUTROPHILS # BLD AUTO: 2.04 K/UL — SIGNIFICANT CHANGE UP (ref 1.8–7.4)
NEUTROPHILS NFR BLD AUTO: 47.4 % — SIGNIFICANT CHANGE UP (ref 43–77)
NRBC # BLD: 0 /100 WBCS — SIGNIFICANT CHANGE UP (ref 0–0)
NRBC BLD-RTO: 0 /100 WBCS — SIGNIFICANT CHANGE UP (ref 0–0)
PLATELET # BLD AUTO: 264 K/UL — SIGNIFICANT CHANGE UP (ref 150–400)
POTASSIUM SERPL-MCNC: 4.5 MMOL/L — SIGNIFICANT CHANGE UP (ref 3.5–5.3)
POTASSIUM SERPL-SCNC: 4.5 MMOL/L — SIGNIFICANT CHANGE UP (ref 3.5–5.3)
PROT SERPL-MCNC: 7.2 G/DL — SIGNIFICANT CHANGE UP (ref 6.4–8.2)
RBC # BLD: 5.37 M/UL — SIGNIFICANT CHANGE UP (ref 4.2–5.8)
RBC # FLD: 13.2 % — SIGNIFICANT CHANGE UP (ref 10.3–14.5)
SODIUM SERPL-SCNC: 143 MMOL/L — SIGNIFICANT CHANGE UP (ref 132–145)
WBC # BLD: 4.31 K/UL — SIGNIFICANT CHANGE UP (ref 3.8–10.5)
WBC # FLD AUTO: 4.31 K/UL — SIGNIFICANT CHANGE UP (ref 3.8–10.5)

## 2025-02-02 PROCEDURE — 99284 EMERGENCY DEPT VISIT MOD MDM: CPT

## 2025-02-02 RX ORDER — EMTRICITABINE AND TENOFOVIR DISOPROXIL FUMARATE 200; 300 MG/1; MG/1
1 TABLET, FILM COATED ORAL
Qty: 30 | Refills: 0
Start: 2025-02-02 | End: 2025-03-03

## 2025-02-02 RX ORDER — RALTEGRAVIR 400 MG/1
1 TABLET, FILM COATED ORAL
Qty: 60 | Refills: 0
Start: 2025-02-02 | End: 2025-03-03

## 2025-02-02 NOTE — ED PROVIDER NOTE - CARE PROVIDER_API CALL
Jess Love.  Internal Medicine  [address] [address]  Phone: [phone]  Fax: [phone]  Follow Up Time:

## 2025-02-02 NOTE — ED PROVIDER NOTE - PATIENT PORTAL LINK FT
You can access the FollowMyHealth Patient Portal offered by [facility] by registering at the following website: [email or web address]. By joining [employer]’s FollowMyHealth portal, you will also be able to view your health information using other applications (apps) compatible with our system.
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
## 2025-02-02 NOTE — ED PROVIDER NOTE - CLINICAL SUMMARY MEDICAL DECISION MAKING FREE TEXT BOX
29-year-old male presents complaining of being stuck by a needle to the right elbow that occurred about an hour ago.  Patient reports it looks like it was a needle used for drugs.  He states he was sitting on a staircase when he noticed this.  He denies injecting drugs.  Tetanus up-to-date.  Patient would like HIV postexposure prophylaxis.  He washed his right elbow with soap and water in the ER.    labs ordered including rapid HIV, anticipate starting PEP if HIV neg, patient to follow up with outpatient.

## 2025-02-02 NOTE — ED PROVIDER NOTE - PHYSICAL EXAMINATION
CONSTITUTIONAL: Well-appearing; well-nourished; in no apparent distress.   	HEAD: Normocephalic; atraumatic.   	RUE: +puncture wound to right elbow. nontender. no fb. good ROM joints, dpi, soft compartments.   	SKIN: Normal for age and race; warm; dry; good turgor; no apparent lesions or rash.   	NEURO: A & O x 3; face symmetric; grossly unremarkable.   PSYCHOLOGICAL: The patient’s mood and manner are appropriate.

## 2025-02-02 NOTE — ED PROVIDER NOTE - OBJECTIVE STATEMENT
29-year-old male presents complaining of being stuck by a needle to the right elbow that occurred about an hour ago.  Patient reports it looks like it was a needle used for drugs.  He states he was sitting on a staircase when he noticed this.  He denies injecting drugs.  Tetanus up-to-date.  Patient would like HIV postexposure prophylaxis.  He washed his right elbow with soap and water in the ER.

## 2025-02-02 NOTE — ED PROVIDER NOTE - NSFOLLOWUPINSTRUCTIONS_ED_ALL_ED_FT
Please take medications as prescribed.  Keep wound clean and dry.  It is important to follow-up for repeat testing, you have been provided with follow-up information for Dr. Love, please call to make an appointment.    Please return to the emergency room for signs of infection such as redness, swelling, severe pain, fever or drainage to the affected area or any concerns.

## 2025-04-26 ENCOUNTER — EMERGENCY (EMERGENCY)
Facility: HOSPITAL | Age: 30
LOS: 1 days | End: 2025-04-26
Attending: EMERGENCY MEDICINE | Admitting: EMERGENCY MEDICINE
Payer: MEDICAID

## 2025-04-26 VITALS
SYSTOLIC BLOOD PRESSURE: 143 MMHG | TEMPERATURE: 98 F | DIASTOLIC BLOOD PRESSURE: 72 MMHG | HEART RATE: 84 BPM | OXYGEN SATURATION: 98 % | RESPIRATION RATE: 16 BRPM | WEIGHT: 212.08 LBS

## 2025-04-26 VITALS
OXYGEN SATURATION: 95 % | RESPIRATION RATE: 16 BRPM | HEART RATE: 80 BPM | SYSTOLIC BLOOD PRESSURE: 138 MMHG | DIASTOLIC BLOOD PRESSURE: 77 MMHG

## 2025-04-26 LAB
FLUAV AG NPH QL: SIGNIFICANT CHANGE UP
FLUBV AG NPH QL: SIGNIFICANT CHANGE UP
RSV RNA NPH QL NAA+NON-PROBE: SIGNIFICANT CHANGE UP
SARS-COV-2 RNA SPEC QL NAA+PROBE: SIGNIFICANT CHANGE UP
SOURCE RESPIRATORY: SIGNIFICANT CHANGE UP

## 2025-04-26 PROCEDURE — 71046 X-RAY EXAM CHEST 2 VIEWS: CPT | Mod: 26

## 2025-04-26 PROCEDURE — 99284 EMERGENCY DEPT VISIT MOD MDM: CPT

## 2025-04-26 RX ORDER — AZITHROMYCIN 250 MG
500 CAPSULE ORAL ONCE
Refills: 0 | Status: COMPLETED | OUTPATIENT
Start: 2025-04-26 | End: 2025-04-26

## 2025-04-26 RX ADMIN — Medication 500 MILLIGRAM(S): at 18:07

## 2025-04-26 NOTE — ED PROVIDER NOTE - PATIENT PORTAL LINK FT
You can access the FollowMyHealth Patient Portal offered by [facility] by registering at the following website: [email or web address]. By joining [employer]’s FollowMyHealth portal, you will also be able to view your health information using other applications (apps) compatible with our system.

## 2025-04-26 NOTE — ED ADULT TRIAGE NOTE - CHIEF COMPLAINT QUOTE
Pt BIBA from street with AMS. Pt states he though he was smoking meth but believes it contained fentanyl. Was found unresponsive, given 4mg IN Narcan on scene. Responsive to voice. Pt also C/O cough stating "I think I have pneumonia".

## 2025-04-26 NOTE — ED ADULT NURSE NOTE - NSFALLRISKINTERV_ED_ALL_ED
Assistance OOB with selected safe patient handling equipment if applicable/Assistance with ambulation/Communicate fall risk and risk factors to all staff, patient, and family/Monitor gait and stability/Provide visual cue: yellow wristband, yellow gown, etc/Reinforce activity limits and safety measures with patient and family/Call bell, personal items and telephone in reach/Instruct patient to call for assistance before getting out of bed/chair/stretcher/Non-slip footwear applied when patient is off stretcher/[place] to call system/Physically safe environment - no spills, clutter or unnecessary equipment/Purposeful Proactive Rounding/Room/bathroom lighting operational, light cord in reach

## 2025-04-26 NOTE — ED PROVIDER NOTE - PHYSICAL EXAMINATION
VITAL SIGNS: I have reviewed nursing notes and confirm.  CONSTITUTIONAL: Well-developed; well-nourished; in no acute distress.  HEAD: Normocephalic; atraumatic.  EYES: EOM intact; conjunctiva and sclera clear.  ENT: nose appears normal  NECK: Supple  CARD: S1, S2 normal; no murmurs, gallops, or rubs. Regular rate and rhythm.  RESP: No wheezes, rales or rhonchi.  EXT: Normal ROM. No clubbing, cyanosis or edema.  PSYCH: Cooperative, appropriate.

## 2025-04-26 NOTE — ED PROVIDER NOTE - CLINICAL SUMMARY MEDICAL DECISION MAKING FREE TEXT BOX
Celeste Rudd presents with respiratory symptoms concerning for possible pneumonia, complicated by recent opioid use and a history of smoking. The differential diagnosis includes pneumonia, acute bronchitis, opioid-induced respiratory depression, and potential COVID-19 or influenza infection. EMS reports the patient was given a dose of intranasal Narcan. Given the patient's history of pneumonia and current symptoms, pneumonia is high on the differential. However, the recent opioid use and subsequent blackout episode raise concerns about potential opioid-related complications.    Plan:  1. Close monitoring of the patient's respiratory status and vital signs.  2. Chest X-ray to evaluate for signs of pneumonia or other lung pathologies.  3. Swab test for influenza and COVID-19 to rule out these infections.  4. Assess opioid use and consider naloxone if signs of opioid toxicity develop.  5. Provide supportive care as needed, including oxygen therapy if indicated.  6. Re-evaluate the patient after test results are available to determine the need for antibiotics or other interventions.    I will return to update the patient once the chest X-ray and swab test results are available. The patient should be monitored closely for any worsening of respiratory symptoms or signs of opioid toxicity.

## 2025-04-26 NOTE — ED PROVIDER NOTE - OBJECTIVE STATEMENT
Celeste Rudd presents to the Emergency Room with concerns of pneumonia. The patient reports symptoms including coughing, darkened mucus, and shallower breath. These symptoms are similar to those experienced during a previous pneumonia episode in September of last year, which resulted in a two-week hospitalization at [facility]. The patient is a smoker, which may contribute to respiratory symptoms. Today, the patient used an opioid called Paleol, though the method of administration was not specified. Following the opioid use, the patient experienced a blackout episode and does not remember standing up. The patient denies experiencing heavy chest pain at this time.

## 2025-04-27 RX ORDER — AZITHROMYCIN 250 MG
1 CAPSULE ORAL
Qty: 4 | Refills: 0
Start: 2025-04-27 | End: 2025-04-30

## 2025-04-28 DIAGNOSIS — Z91.010 ALLERGY TO PEANUTS: ICD-10-CM

## 2025-04-28 DIAGNOSIS — R05.1 ACUTE COUGH: ICD-10-CM

## 2025-04-28 DIAGNOSIS — Z91.018 ALLERGY TO OTHER FOODS: ICD-10-CM

## 2025-04-28 DIAGNOSIS — T40.601A POISONING BY UNSPECIFIED NARCOTICS, ACCIDENTAL (UNINTENTIONAL), INITIAL ENCOUNTER: ICD-10-CM

## 2025-05-27 ENCOUNTER — INPATIENT (INPATIENT)
Facility: HOSPITAL | Age: 30
LOS: 7 days | Discharge: HOME | DRG: 326 | End: 2025-06-04
Attending: STUDENT IN AN ORGANIZED HEALTH CARE EDUCATION/TRAINING PROGRAM | Admitting: STUDENT IN AN ORGANIZED HEALTH CARE EDUCATION/TRAINING PROGRAM
Payer: COMMERCIAL

## 2025-05-27 ENCOUNTER — EMERGENCY (EMERGENCY)
Facility: HOSPITAL | Age: 30
LOS: 1 days | End: 2025-05-27
Attending: EMERGENCY MEDICINE | Admitting: EMERGENCY MEDICINE
Payer: MEDICAID

## 2025-05-27 VITALS
OXYGEN SATURATION: 98 % | HEART RATE: 60 BPM | RESPIRATION RATE: 17 BRPM | HEIGHT: 72 IN | DIASTOLIC BLOOD PRESSURE: 65 MMHG | SYSTOLIC BLOOD PRESSURE: 133 MMHG | TEMPERATURE: 98 F | WEIGHT: 231.49 LBS

## 2025-05-27 VITALS
HEART RATE: 95 BPM | TEMPERATURE: 99 F | SYSTOLIC BLOOD PRESSURE: 147 MMHG | RESPIRATION RATE: 17 BRPM | DIASTOLIC BLOOD PRESSURE: 80 MMHG | OXYGEN SATURATION: 99 %

## 2025-05-27 VITALS
OXYGEN SATURATION: 97 % | RESPIRATION RATE: 17 BRPM | HEART RATE: 86 BPM | SYSTOLIC BLOOD PRESSURE: 131 MMHG | HEIGHT: 72 IN | WEIGHT: 214.95 LBS | TEMPERATURE: 99 F | DIASTOLIC BLOOD PRESSURE: 84 MMHG

## 2025-05-27 DIAGNOSIS — K66.8 OTHER SPECIFIED DISORDERS OF PERITONEUM: ICD-10-CM

## 2025-05-27 DIAGNOSIS — Z91.018 ALLERGY TO OTHER FOODS: ICD-10-CM

## 2025-05-27 DIAGNOSIS — Z91.010 ALLERGY TO PEANUTS: ICD-10-CM

## 2025-05-27 LAB
ALBUMIN SERPL ELPH-MCNC: 3.8 G/DL — SIGNIFICANT CHANGE UP (ref 3.4–5)
ALP SERPL-CCNC: 72 U/L — SIGNIFICANT CHANGE UP (ref 40–120)
ALT FLD-CCNC: 21 U/L — SIGNIFICANT CHANGE UP (ref 12–42)
ANION GAP SERPL CALC-SCNC: 10 MMOL/L — SIGNIFICANT CHANGE UP (ref 5–17)
ANION GAP SERPL CALC-SCNC: 7 MMOL/L — LOW (ref 9–16)
APTT BLD: 27.9 SEC — SIGNIFICANT CHANGE UP (ref 26.1–36.8)
APTT BLD: 29.1 SEC — SIGNIFICANT CHANGE UP (ref 26.1–36.8)
AST SERPL-CCNC: 16 U/L — SIGNIFICANT CHANGE UP (ref 15–37)
BASOPHILS # BLD AUTO: 0.03 K/UL — SIGNIFICANT CHANGE UP (ref 0–0.2)
BASOPHILS # BLD AUTO: 0.03 K/UL — SIGNIFICANT CHANGE UP (ref 0–0.2)
BASOPHILS NFR BLD AUTO: 0.3 % — SIGNIFICANT CHANGE UP (ref 0–2)
BASOPHILS NFR BLD AUTO: 0.3 % — SIGNIFICANT CHANGE UP (ref 0–2)
BILIRUB SERPL-MCNC: 0.4 MG/DL — SIGNIFICANT CHANGE UP (ref 0.2–1.2)
BLD GP AB SCN SERPL QL: NEGATIVE — SIGNIFICANT CHANGE UP
BUN SERPL-MCNC: 10 MG/DL — SIGNIFICANT CHANGE UP (ref 7–23)
BUN SERPL-MCNC: 13 MG/DL — SIGNIFICANT CHANGE UP (ref 7–23)
CALCIUM SERPL-MCNC: 8.6 MG/DL — SIGNIFICANT CHANGE UP (ref 8.4–10.5)
CALCIUM SERPL-MCNC: 9.2 MG/DL — SIGNIFICANT CHANGE UP (ref 8.5–10.5)
CHLORIDE SERPL-SCNC: 104 MMOL/L — SIGNIFICANT CHANGE UP (ref 96–108)
CHLORIDE SERPL-SCNC: 107 MMOL/L — SIGNIFICANT CHANGE UP (ref 96–108)
CO2 SERPL-SCNC: 21 MMOL/L — LOW (ref 22–31)
CO2 SERPL-SCNC: 29 MMOL/L — SIGNIFICANT CHANGE UP (ref 22–31)
CREAT SERPL-MCNC: 0.8 MG/DL — SIGNIFICANT CHANGE UP (ref 0.5–1.3)
CREAT SERPL-MCNC: 0.95 MG/DL — SIGNIFICANT CHANGE UP (ref 0.5–1.3)
EGFR: 110 ML/MIN/1.73M2 — SIGNIFICANT CHANGE UP
EGFR: 110 ML/MIN/1.73M2 — SIGNIFICANT CHANGE UP
EGFR: 122 ML/MIN/1.73M2 — SIGNIFICANT CHANGE UP
EGFR: 122 ML/MIN/1.73M2 — SIGNIFICANT CHANGE UP
EOSINOPHIL # BLD AUTO: 0 K/UL — SIGNIFICANT CHANGE UP (ref 0–0.5)
EOSINOPHIL # BLD AUTO: 0.08 K/UL — SIGNIFICANT CHANGE UP (ref 0–0.5)
EOSINOPHIL NFR BLD AUTO: 0 % — SIGNIFICANT CHANGE UP (ref 0–6)
EOSINOPHIL NFR BLD AUTO: 0.8 % — SIGNIFICANT CHANGE UP (ref 0–6)
ETHANOL SERPL-MCNC: <3 MG/DL — SIGNIFICANT CHANGE UP
GLUCOSE SERPL-MCNC: 119 MG/DL — HIGH (ref 70–99)
GLUCOSE SERPL-MCNC: 90 MG/DL — SIGNIFICANT CHANGE UP (ref 70–99)
HCT VFR BLD CALC: 40.9 % — SIGNIFICANT CHANGE UP (ref 39–50)
HCT VFR BLD CALC: 47.1 % — SIGNIFICANT CHANGE UP (ref 39–50)
HGB BLD-MCNC: 14 G/DL — SIGNIFICANT CHANGE UP (ref 13–17)
HGB BLD-MCNC: 15.3 G/DL — SIGNIFICANT CHANGE UP (ref 13–17)
IMM GRANULOCYTES # BLD AUTO: 0.02 K/UL — SIGNIFICANT CHANGE UP (ref 0–0.07)
IMM GRANULOCYTES NFR BLD AUTO: 0.2 % — SIGNIFICANT CHANGE UP (ref 0–0.9)
IMM GRANULOCYTES NFR BLD AUTO: 0.2 % — SIGNIFICANT CHANGE UP (ref 0–0.9)
INR BLD: 1 — SIGNIFICANT CHANGE UP (ref 0.85–1.16)
INR BLD: 1.02 — SIGNIFICANT CHANGE UP (ref 0.85–1.16)
LACTATE BLDV-MCNC: 0.8 MMOL/L — SIGNIFICANT CHANGE UP (ref 0.5–2)
LIDOCAIN IGE QN: 26 U/L — SIGNIFICANT CHANGE UP (ref 16–77)
LYMPHOCYTES # BLD AUTO: 0.78 K/UL — LOW (ref 1–3.3)
LYMPHOCYTES # BLD AUTO: 0.8 K/UL — LOW (ref 1–3.3)
LYMPHOCYTES # BLD AUTO: 7.4 % — LOW (ref 13–44)
LYMPHOCYTES NFR BLD AUTO: 7.9 % — LOW (ref 13–44)
MCHC RBC-ENTMCNC: 25 PG — LOW (ref 27–34)
MCHC RBC-ENTMCNC: 25.9 PG — LOW (ref 27–34)
MCHC RBC-ENTMCNC: 32.5 G/DL — SIGNIFICANT CHANGE UP (ref 32–36)
MCHC RBC-ENTMCNC: 34.2 G/DL — SIGNIFICANT CHANGE UP (ref 32–36)
MCV RBC AUTO: 75.7 FL — LOW (ref 80–100)
MCV RBC AUTO: 76.8 FL — LOW (ref 80–100)
MONOCYTES # BLD AUTO: 0.48 K/UL — SIGNIFICANT CHANGE UP (ref 0–0.9)
MONOCYTES # BLD AUTO: 0.73 K/UL — SIGNIFICANT CHANGE UP (ref 0–0.9)
MONOCYTES NFR BLD AUTO: 4.7 % — SIGNIFICANT CHANGE UP (ref 2–14)
MONOCYTES NFR BLD AUTO: 6.9 % — SIGNIFICANT CHANGE UP (ref 2–14)
NEUTROPHILS # BLD AUTO: 8.73 K/UL — HIGH (ref 1.8–7.4)
NEUTROPHILS # BLD AUTO: 9.02 K/UL — HIGH (ref 1.8–7.4)
NEUTROPHILS NFR BLD AUTO: 85.2 % — HIGH (ref 43–77)
NEUTROPHILS NFR BLD AUTO: 86.1 % — HIGH (ref 43–77)
NRBC # BLD AUTO: 0 K/UL — SIGNIFICANT CHANGE UP (ref 0–0)
NRBC # FLD: 0 K/UL — SIGNIFICANT CHANGE UP (ref 0–0)
NRBC BLD AUTO-RTO: 0 /100 WBCS — SIGNIFICANT CHANGE UP (ref 0–0)
NRBC BLD AUTO-RTO: 0 /100 WBCS — SIGNIFICANT CHANGE UP (ref 0–0)
PCP SPEC-MCNC: SIGNIFICANT CHANGE UP
PLATELET # BLD AUTO: 236 K/UL — SIGNIFICANT CHANGE UP (ref 150–400)
PLATELET # BLD AUTO: 264 K/UL — SIGNIFICANT CHANGE UP (ref 150–400)
PMV BLD: 9.5 FL — SIGNIFICANT CHANGE UP (ref 7–13)
POTASSIUM SERPL-MCNC: 4.1 MMOL/L — SIGNIFICANT CHANGE UP (ref 3.5–5.3)
POTASSIUM SERPL-MCNC: 4.2 MMOL/L — SIGNIFICANT CHANGE UP (ref 3.5–5.3)
POTASSIUM SERPL-SCNC: 4.1 MMOL/L — SIGNIFICANT CHANGE UP (ref 3.5–5.3)
POTASSIUM SERPL-SCNC: 4.2 MMOL/L — SIGNIFICANT CHANGE UP (ref 3.5–5.3)
PROT SERPL-MCNC: 7.4 G/DL — SIGNIFICANT CHANGE UP (ref 6.4–8.2)
PROTHROM AB SERPL-ACNC: 11.7 SEC — SIGNIFICANT CHANGE UP (ref 9.9–13.4)
PROTHROM AB SERPL-ACNC: 11.7 SEC — SIGNIFICANT CHANGE UP (ref 9.9–13.4)
RBC # BLD: 5.4 M/UL — SIGNIFICANT CHANGE UP (ref 4.2–5.8)
RBC # BLD: 6.13 M/UL — HIGH (ref 4.2–5.8)
RBC # FLD: 13.7 % — SIGNIFICANT CHANGE UP (ref 10.3–14.5)
RBC # FLD: 13.9 % — SIGNIFICANT CHANGE UP (ref 10.3–14.5)
RH IG SCN BLD-IMP: POSITIVE — SIGNIFICANT CHANGE UP
RH IG SCN BLD-IMP: POSITIVE — SIGNIFICANT CHANGE UP
SODIUM SERPL-SCNC: 138 MMOL/L — SIGNIFICANT CHANGE UP (ref 135–145)
SODIUM SERPL-SCNC: 140 MMOL/L — SIGNIFICANT CHANGE UP (ref 132–145)
WBC # BLD: 10.14 K/UL — SIGNIFICANT CHANGE UP (ref 3.8–10.5)
WBC # BLD: 10.58 K/UL — HIGH (ref 3.8–10.5)
WBC # FLD AUTO: 10.14 K/UL — SIGNIFICANT CHANGE UP (ref 3.8–10.5)
WBC # FLD AUTO: 10.58 K/UL — HIGH (ref 3.8–10.5)

## 2025-05-27 PROCEDURE — 99285 EMERGENCY DEPT VISIT HI MDM: CPT

## 2025-05-27 PROCEDURE — 49905 OMENTAL FLAP INTRA-ABDOM: CPT | Mod: 59

## 2025-05-27 PROCEDURE — 88305 TISSUE EXAM BY PATHOLOGIST: CPT | Mod: 26

## 2025-05-27 PROCEDURE — 43840 GSTRRPHY SUTR DUOL/GSTR ULCR: CPT

## 2025-05-27 PROCEDURE — 49020 DRAINAGE ABDOM ABSCESS OPEN: CPT | Mod: 59

## 2025-05-27 PROCEDURE — 99291 CRITICAL CARE FIRST HOUR: CPT

## 2025-05-27 PROCEDURE — 99223 1ST HOSP IP/OBS HIGH 75: CPT | Mod: 57

## 2025-05-27 PROCEDURE — 74177 CT ABD & PELVIS W/CONTRAST: CPT | Mod: 26

## 2025-05-27 RX ORDER — HYDROMORPHONE/SOD CHLOR,ISO/PF 2 MG/10 ML
0.5 SYRINGE (ML) INJECTION EVERY 4 HOURS
Refills: 0 | Status: DISCONTINUED | OUTPATIENT
Start: 2025-05-27 | End: 2025-05-28

## 2025-05-27 RX ORDER — FLUCONAZOLE 150 MG
400 TABLET ORAL EVERY 24 HOURS
Refills: 0 | Status: DISCONTINUED | OUTPATIENT
Start: 2025-05-27 | End: 2025-06-01

## 2025-05-27 RX ORDER — HEPARIN SODIUM 1000 [USP'U]/ML
5000 INJECTION INTRAVENOUS; SUBCUTANEOUS EVERY 8 HOURS
Refills: 0 | Status: DISCONTINUED | OUTPATIENT
Start: 2025-05-27 | End: 2025-06-04

## 2025-05-27 RX ORDER — ONDANSETRON HCL/PF 4 MG/2 ML
4 VIAL (ML) INJECTION EVERY 6 HOURS
Refills: 0 | Status: DISCONTINUED | OUTPATIENT
Start: 2025-05-27 | End: 2025-06-04

## 2025-05-27 RX ORDER — ACETAMINOPHEN 500 MG/5ML
1000 LIQUID (ML) ORAL ONCE
Refills: 0 | Status: DISCONTINUED | OUTPATIENT
Start: 2025-05-27 | End: 2025-06-01

## 2025-05-27 RX ORDER — SODIUM CHLORIDE 9 G/1000ML
1000 INJECTION, SOLUTION INTRAVENOUS
Refills: 0 | Status: DISCONTINUED | OUTPATIENT
Start: 2025-05-27 | End: 2025-05-27

## 2025-05-27 RX ORDER — HYDROMORPHONE/SOD CHLOR,ISO/PF 2 MG/10 ML
0.25 SYRINGE (ML) INJECTION EVERY 4 HOURS
Refills: 0 | Status: DISCONTINUED | OUTPATIENT
Start: 2025-05-27 | End: 2025-06-01

## 2025-05-27 RX ORDER — HYDROMORPHONE/SOD CHLOR,ISO/PF 2 MG/10 ML
0.5 SYRINGE (ML) INJECTION EVERY 4 HOURS
Refills: 0 | Status: DISCONTINUED | OUTPATIENT
Start: 2025-05-27 | End: 2025-06-01

## 2025-05-27 RX ORDER — SODIUM CHLORIDE 9 G/1000ML
1000 INJECTION, SOLUTION INTRAVENOUS
Refills: 0 | Status: DISCONTINUED | OUTPATIENT
Start: 2025-05-27 | End: 2025-06-01

## 2025-05-27 RX ORDER — PIPERACILLIN-TAZO-DEXTROSE,ISO 3.375G/5
3.38 IV SOLUTION, PIGGYBACK PREMIX FROZEN(ML) INTRAVENOUS EVERY 8 HOURS
Refills: 0 | Status: DISCONTINUED | OUTPATIENT
Start: 2025-05-27 | End: 2025-06-01

## 2025-05-27 RX ORDER — KETOROLAC TROMETHAMINE 30 MG/ML
30 INJECTION, SOLUTION INTRAMUSCULAR; INTRAVENOUS ONCE
Refills: 0 | Status: DISCONTINUED | OUTPATIENT
Start: 2025-05-27 | End: 2025-05-27

## 2025-05-27 RX ORDER — PIPERACILLIN-TAZO-DEXTROSE,ISO 2.25G/50ML
3.38 IV SOLUTION, PIGGYBACK PREMIX FROZEN(ML) INTRAVENOUS ONCE
Refills: 0 | Status: COMPLETED | OUTPATIENT
Start: 2025-05-27 | End: 2025-05-27

## 2025-05-27 RX ORDER — BUPIVACAINE 13.3 MG/ML
20 INJECTION, SUSPENSION, LIPOSOMAL INFILTRATION ONCE
Refills: 0 | Status: DISCONTINUED | OUTPATIENT
Start: 2025-05-27 | End: 2025-05-28

## 2025-05-27 RX ADMIN — Medication 4 MILLIGRAM(S): at 16:28

## 2025-05-27 RX ADMIN — Medication 1000 MILLILITER(S): at 16:29

## 2025-05-27 RX ADMIN — Medication 25 GRAM(S): at 23:01

## 2025-05-27 RX ADMIN — Medication 100 MILLIGRAM(S): at 22:53

## 2025-05-27 RX ADMIN — SODIUM CHLORIDE 140 MILLILITER(S): 9 INJECTION, SOLUTION INTRAVENOUS at 19:19

## 2025-05-27 RX ADMIN — KETOROLAC TROMETHAMINE 30 MILLIGRAM(S): 30 INJECTION, SOLUTION INTRAMUSCULAR; INTRAVENOUS at 14:22

## 2025-05-27 RX ADMIN — Medication 200 GRAM(S): at 16:30

## 2025-05-27 RX ADMIN — SODIUM CHLORIDE 140 MILLILITER(S): 9 INJECTION, SOLUTION INTRAVENOUS at 22:54

## 2025-05-27 RX ADMIN — Medication 1000 MILLILITER(S): at 14:22

## 2025-05-27 NOTE — ED ADULT NURSE NOTE - NS ED NURSE RECORD ANOTHER HT AND WT
Medication:   Requested Prescriptions     Pending Prescriptions Disp Refills    lisinopril (PRINIVIL;ZESTRIL) 40 MG tablet 90 tablet 1     Sig: Take 1 tablet by mouth daily    hydroCHLOROthiazide (HYDRODIURIL) 25 MG tablet 30 tablet 5     Sig: Take 1 tablet by mouth every morning        Last Filled:      Patient Phone Number: [phone] (home)     Last appt: 2/10/2022   Next appt: Visit date not found    Last OARRS: No flowsheet data found.
PT called in requesting several refills. Pt needs a refill for his Lisinopril & HCTZ. He also requested a refill for his erectile dysfunction medication but he wants to go back on [place]. PT adv that the Tadalafil is not working for him. Please send to the [facility] on [facility].     Best call back number: [phone]
Yes

## 2025-05-27 NOTE — ED ADULT NURSE NOTE - OBJECTIVE STATEMENT
Pt presents to ed stating diffuse abd pain since 1100. Pt denies history. Pt admits to heroin use PTA. lethargic, easily arousable, maintaining airway. Pt is in no distress. PIV placed, labs sent. Medicated, IVF infusing.

## 2025-05-27 NOTE — H&P ADULT - ASSESSMENT
29M PSHx appendectomy, admitted for findings of scattered foci of free air concerning for possible bowel perforation, physical exam remarkable for signifiant tenderness to palpation diffusely. Plan to admit to telemetry, added on to OR schedule    Admit - Tele - [facility]  NPO/IVF  Pain/nausea PRN  Pre-op labs, drug urine screen  Added on to OR schedule  29M PSHx appendectomy, admitted for findings of scattered foci of free air concerning for possible bowel perforation, physical exam remarkable for significant tenderness to palpation diffusely. Plan to admit to telemetry, added on to OR schedule    Admit - Tele - [facility]  NPO/IVF  Pain/nausea PRN  Pre-op labs, drug urine screen  Added on to OR schedule

## 2025-05-27 NOTE — ED ADULT NURSE NOTE - NSFALLRISKINTERV_ED_ALL_ED
Assistance with ambulation/Communicate fall risk and risk factors to all staff, patient, and family/Provide visual cue: yellow wristband, yellow gown, etc/Reinforce activity limits and safety measures with patient and family/Call bell, personal items and telephone in reach/Instruct patient to call for assistance before getting out of bed/chair/stretcher/Non-slip footwear applied when patient is off stretcher/[place] to call system/Physically safe environment - no spills, clutter or unnecessary equipment/Purposeful Proactive Rounding/Room/bathroom lighting operational, light cord in reach

## 2025-05-27 NOTE — BRIEF OPERATIVE NOTE - NSICDXBRIEFPROCEDURE_GEN_ALL_CORE_FT
PROCEDURES:  Exploratory laparotomy 30-May-2025 11:02:26  Taylor Nair  Repair of gastric or duodenal ulcer 30-May-2025 11:02:47  Taylor Nair

## 2025-05-27 NOTE — ED PROVIDER NOTE - OBJECTIVE STATEMENT
29-year-old male, otherwise healthy, denies any past medical history of chronic medications, past surgical history of appendectomy, history of substance abuse including THC and cocaine use, presents today as a transfer from [facility] ED for peritonitis and free air found on his CT A/P. Patient to be admitted to Surgery for OR.  Patient states that  several hours ago he had a "cold" sensation in his abdomen > upper abdomen with abdominal pain and nausea.  He went to [facility] ED where he had a CT A/P and was sent to [facility] ED for perforated viscus.  Patient was given IV Zosyn and pain medications at [facility].  Denies any fevers, diarrhea, chest pain, shortness of breath or change in his bowel movements.  No other complaints.

## 2025-05-27 NOTE — PRE-ANESTHESIA EVALUATION ADULT - NSANTHADDINFOFT_GEN_ALL_CORE
Risks, benefits and alternatives discussed; including but not limited to headache, nausea, bleeding, infection and local trauma/positioning related injury. All questions answered. The patient agrees to proceed.    Plan: GA

## 2025-05-27 NOTE — ED PROVIDER NOTE - CLINICAL SUMMARY MEDICAL DECISION MAKING FREE TEXT BOX
29-year-old male, otherwise healthy, denies any past medical history of chronic medications, past surgical history of appendectomy, history of substance abuse including THC and cocaine use, presents today as a transfer from [facility] ED for peritonitis and free air found on his CT A/P. Patient to be admitted to Surgery for OR.  Patient states that  several hours ago he had a "cold" sensation in his abdomen > upper abdomen with abdominal pain and nausea.  He went to [facility] ED where he had a CT A/P and was sent to [facility] ED for perforated viscus.  Patient was given IV Zosyn and pain medications at [facility].  Denies any fevers, diarrhea, chest pain, shortness of breath or change in his bowel movements.  No other complaints.      ED course: Vital signs noted.  Patient afebrile and rest of her vital signs are within normal limits.  Patient with peritonitis on exam. Labs and CT A/P noted.  CT A/P shows ".  Surgery consulted and in the ED to see patient. Repeat labs noted. Patient admitted to surgery/telemetry. Stable in ED. 29-year-old male, otherwise healthy, denies any past medical history of chronic medications, past surgical history of appendectomy, history of substance abuse including THC and cocaine use, presents today as a transfer from [facility] ED for peritonitis and free air found on his CT A/P. Patient to be admitted to Surgery for OR.  Patient states that  several hours ago he had a "cold" sensation in his abdomen > upper abdomen with abdominal pain and nausea.  He went to [facility] ED where he had a CT A/P and was sent to [facility] ED for perforated viscus.  Patient was given IV Zosyn and pain medications at [facility].  Denies any fevers, diarrhea, chest pain, shortness of breath or change in his bowel movements.  No other complaints.    ED course: Vital signs noted.  Patient afebrile and rest of her vital signs are within normal limits.  Patient with peritonitis on exam. Labs and CT A/P noted. Lactate normal.  CT A/P shows "Scattered dependent and nondependent foci of free air with mild perihepatic free fluid and fluid in the left paracolic gutter. The source is unknown in this limited evaluation without enteric contrast and due to the patient's thin body habitus."  Surgery consulted and in the ED to see patient. Repeat labs noted. Patient admitted to surgery/telemetry. Stable in ED.

## 2025-05-27 NOTE — H&P ADULT - NSHPLABSRESULTS_GEN_ALL_CORE
CT Abdomen and Pelvis w/ IV Cont:   ACC: [id number] EXAM:  CT ABDOMEN AND PELVIS IC   ORDERED BY: DARYL ZAMORA     PROCEDURE DATE:  05/27/2025          INTERPRETATION:  CLINICAL INFORMATION: Upper abdominal pain    COMPARISON: None.    CONTRAST/COMPLICATIONS:  IV Contrast: Omnipaque 350  96 cc administered   4 cc discarded  Oral Contrast: NONE  .    PROCEDURE:  CT of the Abdomen and Pelvis was performed.  Sagittal and coronal reformats were performed.    FINDINGS:  LOWER CHEST: Within normal limits.    LIVER: Subcentimeter hypodensity seen peripherally in the right hepatic   lobe, benign-appearing  BILE DUCTS: Normal caliber.  GALLBLADDER: Within normal limits.  SPLEEN: Within normal limits.  PANCREAS: Within normal limits.  ADRENALS: Within normal limits.  KIDNEYS/URETERS:Within normal limits.    BLADDER: Within normal limits.  REPRODUCTIVE ORGANS: Within normal limits    BOWEL: No bowel obstruction. Appendix is not visualized. Limited   evaluation of the bowel due to the lack of enteric contrast.  PERITONEUM/RETROPERITONEUM: Scattered dependent and nondependent foci of   free air are noted, particularly along the midline anterior peritoneum   (2:46). Mild perihepatic fluid and mild fluid is noted in the left   paracolic gutter. No loculated collections to suggestan abscess. These   findings are strongly suggestive of bowel perforation.  VESSELS: Within normal limits.  LYMPH NODES: No lymphadenopathy.  ABDOMINAL WALL: Within normal limits.  BONES: Within normal limits.    IMPRESSION:  Scattered dependent and nondependent foci of free air with mild   perihepatic free fluid and fluid in the left paracolic gutter. The source   is unknown in this limited evaluation without enteric contrast and due to   the patient's thin body habitus.    The above findings were conveyed to Dr. Daryl Zamora in the emergency   department at [facility], at the time of this   dictation.        --- End of Report ---            SADIQ CONTEH MD; Attending Radiologist  This document has been electronically signed. May 27 2025  3:48PM (05-27-25 @ 15:26)

## 2025-05-27 NOTE — H&P ADULT - HISTORY OF PRESENT ILLNESS
30yo Male pt with PMH PNA, PSHx appendectomy, presents from [facility] reporting sharp abd pain to epigastrium/LUQ which began today, w/ radiation to RUQ, pt denies any nausea, vomiting, fever, chills, diarrhea, CP, SOB. In the ED, pt afebrile, nontachycardic, normotensive, and satting on RA. On exam, abd diffusely tender to palpation.    Labs demonstrate WBC 10, hgb 15.3, LA 0.8, CMP WNL.    CTAP showing scattered foci of free air w/ perihepatic free fluid in LT paracolic gutter, concerning for possible bowel perforation.    Pt reports intermittent meth/cocaine use, last use x3 days ago, no reported sx of withdrawl.    PMH: PNA  PSHx: Appendectomy  Medications: NA  Allergies: Peanuts  Social Hx: Lives in shelter, [address], intermittent meth/cocaine use, last use Saturday  Family Hx: Denies family hx of IBS, Crohn's, UC, or colon cancer.  Last colonoscopy: NA  Last EGD: NA    T(C): 37.1 (05-27-25 @ 17:31), Max: 37.1 (05-27-25 @ 16:49)  HR: 86 (05-27-25 @ 17:31) (60 - 95)  BP: 131/84 (05-27-25 @ 17:31) (131/84 - 147/80)  RR: 17 (05-27-25 @ 17:31) (17 - 17)  SpO2: 97% (05-27-25 @ 17:31) (97% - 99%)    Physical Exam  General: AAOx3, NAD, laying comfortably in bed  Cardio: S1,S2, No MRG  Pulm: Nonlabored breathing  Abdomen:  Extremities: WWP, peripheral pulses appreciated      LABS:                        15.3   10.14 )-----------( 264      ( 27 May 2025 13:25 )             47.1     05-27    140  |  104  |  13  ----------------------------<  90  4.1   |  29  |  0.95    Ca    9.2      27 May 2025 13:25    TPro  7.4  /  Alb  3.8  /  TBili  0.4  /  DBili  x   /  AST  16  /  ALT  21  /  AlkPhos  72  05-27    PT/INR - ( 27 May 2025 15:48 )   PT: 11.7 sec;   INR: 1.00          PTT - ( 27 May 2025 15:48 )  PTT:29.1 sec   28yo Male pt with PMH PNA, PSHx appendectomy, presents from [facility] reporting sharp abd pain to epigastrium/LUQ which began today, w/ radiation to RUQ, pt denies any nausea, vomiting, fever, chills, diarrhea, CP, SOB. In the ED, pt afebrile, nontachycardic, normotensive, and satting on RA. On exam, abd diffusely tender to palpation, nondistended. Labs demonstrate WBC 10, hgb 15.3, LA 0.8, CMP WNL. CTAP showing scattered foci of free air w/ perihepatic free fluid in LT paracolic gutter, concerning for possible bowel perforation. Pt reports intermittent meth/cocaine use, last use x3 days ago, no reported sx of withdrawal    PMH: PNA  PSHx: Appendectomy  Medications: NA  Allergies: Peanuts  Social Hx: Lives in shelter, [address], intermittent meth/cocaine use, last use Saturday  Family Hx: Denies family hx of IBS, Crohn's, UC, or colon cancer.  Last colonoscopy: NA  Last EGD: NA    T(C): 37.1 (05-27-25 @ 17:31), Max: 37.1 (05-27-25 @ 16:49)  HR: 86 (05-27-25 @ 17:31) (60 - 95)  BP: 131/84 (05-27-25 @ 17:31) (131/84 - 147/80)  RR: 17 (05-27-25 @ 17:31) (17 - 17)  SpO2: 97% (05-27-25 @ 17:31) (97% - 99%)    Physical Exam  General: AAOx3, NAD, laying comfortably in bed  Cardio: S1,S2, No MRG  Pulm: Nonlabored breathing  Abdomen: Soft, diffusely tender to palpation, ND  Extremities: WWP, peripheral pulses appreciated      LABS:                        15.3   10.14 )-----------( 264      ( 27 May 2025 13:25 )             47.1     05-27    140  |  104  |  13  ----------------------------<  90  4.1   |  29  |  0.95    Ca    9.2      27 May 2025 13:25    TPro  7.4  /  Alb  3.8  /  TBili  0.4  /  DBili  x   /  AST  16  /  ALT  21  /  AlkPhos  72  05-27    PT/INR - ( 27 May 2025 15:48 )   PT: 11.7 sec;   INR: 1.00          PTT - ( 27 May 2025 15:48 )  PTT:29.1 sec

## 2025-05-27 NOTE — ED PROVIDER NOTE - PROGRESS NOTE DETAILS
Called by radiology attending, who said patient has free air and free fluid in the abdomen. Patient reports pain is getting worse, now hurts with any movement. I called transfer center for tier 1 transfer. I consulted with Dr. Huber, surgery attending. Patient to go ED to ED. Accepted to [facility] ED by

## 2025-05-27 NOTE — PRE-ANESTHESIA EVALUATION ADULT - NSANTHPMHFT_GEN_ALL_CORE
28yo Male pt with PMH PNA, PSHx appendectomy, presents from [facility] reporting sharp abd pain to epigastrium/LUQ which began today, w/ radiation to RUQ, pt denies any nausea, vomiting, fever, chills, diarrhea, CP, SOB. In the ED, pt afebrile, nontachycardic, normotensive, and satting on RA. On exam, abd diffusely tender to palpation, nondistended. Labs demonstrate WBC 10, hgb 15.3, LA 0.8, CMP WNL. CTAP showing scattered foci of free air w/ perihepatic free fluid in LT paracolic gutter, concerning for possible bowel perforation. Pt reports intermittent meth/cocaine use, last use x3 days ago, no reported sx of withdrawal

## 2025-05-27 NOTE — BRIEF OPERATIVE NOTE - OPERATION/FINDINGS
Upper midline laparotomy. Immediate visualization of bile in the abdomen. Identification of 5mm anterior gastric ulcer. Biopsy obtained. Buck patch omental repair. EGD performed. Leak test negative. NGT placed and confirmed intraoperatively. Fascia closed with PDS. Skin closed with staples.

## 2025-05-27 NOTE — ED PROVIDER NOTE - OBJECTIVE STATEMENT
Patient reports that he has been having abdominal pain for 3 hours.  Worse in his upper abdomen.  No fever, chest pain, shortness of breath, nausea, vomiting, diarrhea, dysuria, urgency, frequency.  Denies any drug or alcohol use today.

## 2025-05-27 NOTE — ED ADULT NURSE NOTE - CHIEF COMPLAINT QUOTE
Pt presents to ED as transfer from [facility] as tier one transfer. As per hospital report, "pt r/o perforation to abd with free air". Pt reports feeling " pressure to abd" on arrival but relief from pain. EMS states, " He received morphine PTA. Hx appendectomy 2005. UPG to MD.

## 2025-05-27 NOTE — H&P ADULT - ATTENDING COMMENTS
Patient is a 29 year old gentleman with history of appendectomy who presents to ED with complaints of acute abdominal pain. Clinical, laboratory, radiographic findings consistent with perforated viscous and pneumoperitoneum likely due to gastric perforation. Patient reports taking significant amounts of Aleve over past one month for chronic back pain. At time of evaluation, afebrile, hemodynamically stable, abdomen soft, tender to palpation, distended with rebound and guarding. CT imaging reviewed demonstrates pneumoperitoneum. Labs reviewed. Plan for admission, bowel rest, IV fluids, IV antibiotics, OR for exploratory laparotomy, late entry date of service 5/27/25.

## 2025-05-27 NOTE — ED ADULT NURSE NOTE - NSFALLHARMRISKINTERV_ED_ALL_ED

## 2025-05-27 NOTE — ED PROVIDER NOTE - CHIEF COMPLAINT
12/28/2017 Holter monitor with atrial fibrillation.  No symptoms reported.  Average heart rate 76   The patient is a 29y Male complaining of abdominal pain.

## 2025-05-27 NOTE — ED ADULT NURSE NOTE - OBJECTIVE STATEMENT
29yM BIBEMS transfer from [facility] complaining of abdominal pain. Patient reports diffuse abdominal pain starting this morning. Pain controlled at this time. Denies F/C, SOB/CP

## 2025-05-27 NOTE — H&P ADULT - TIME BILLING
evaluation and management of perforated viscous, review of labs and imaging, discussion with patient, documentation

## 2025-05-27 NOTE — PRE-ANESTHESIA EVALUATION ADULT - NSANTHPEFT_GEN_ALL_CORE
General: Appearance is consistent with chronological age. No abnormal facies.  Uncomfortable appearing  EENT: Anicteric sclera; oropharynx clear, moist mucus membranes  Cardiovascular:  Rate and rhythm evaluated  Respiratory: Unlabored breathing  Neurological: Awake and alert, moves all extremities  Constitutional: METs>4

## 2025-05-27 NOTE — ED ADULT TRIAGE NOTE - CHIEF COMPLAINT QUOTE
Pt presents to ED as transfer from [facility] as tier one transfer. As per hospital report, "pt r/o perforation to abd with free air". Pt reports feeling " pressure to abd" on arrival but relief from pain. EMS states, " He received morphine PTA. Hx appendectomy 2005. UPG to MD.

## 2025-05-28 LAB
ANION GAP SERPL CALC-SCNC: 11 MMOL/L — SIGNIFICANT CHANGE UP (ref 5–17)
BUN SERPL-MCNC: 9 MG/DL — SIGNIFICANT CHANGE UP (ref 7–23)
CALCIUM SERPL-MCNC: 8.8 MG/DL — SIGNIFICANT CHANGE UP (ref 8.4–10.5)
CHLORIDE SERPL-SCNC: 104 MMOL/L — SIGNIFICANT CHANGE UP (ref 96–108)
CO2 SERPL-SCNC: 22 MMOL/L — SIGNIFICANT CHANGE UP (ref 22–31)
CREAT SERPL-MCNC: 0.96 MG/DL — SIGNIFICANT CHANGE UP (ref 0.5–1.3)
EGFR: 109 ML/MIN/1.73M2 — SIGNIFICANT CHANGE UP
EGFR: 109 ML/MIN/1.73M2 — SIGNIFICANT CHANGE UP
GLUCOSE SERPL-MCNC: 124 MG/DL — HIGH (ref 70–99)
HCT VFR BLD CALC: 41.5 % — SIGNIFICANT CHANGE UP (ref 39–50)
HGB BLD-MCNC: 14.1 G/DL — SIGNIFICANT CHANGE UP (ref 13–17)
MAGNESIUM SERPL-MCNC: 1.7 MG/DL — SIGNIFICANT CHANGE UP (ref 1.6–2.6)
MCHC RBC-ENTMCNC: 25.8 PG — LOW (ref 27–34)
MCHC RBC-ENTMCNC: 34 G/DL — SIGNIFICANT CHANGE UP (ref 32–36)
MCV RBC AUTO: 75.9 FL — LOW (ref 80–100)
NRBC BLD AUTO-RTO: 0 /100 WBCS — SIGNIFICANT CHANGE UP (ref 0–0)
PHOSPHATE SERPL-MCNC: 3.9 MG/DL — SIGNIFICANT CHANGE UP (ref 2.5–4.5)
PLATELET # BLD AUTO: 263 K/UL — SIGNIFICANT CHANGE UP (ref 150–400)
POTASSIUM SERPL-MCNC: 4.6 MMOL/L — SIGNIFICANT CHANGE UP (ref 3.5–5.3)
POTASSIUM SERPL-SCNC: 4.6 MMOL/L — SIGNIFICANT CHANGE UP (ref 3.5–5.3)
RBC # BLD: 5.47 M/UL — SIGNIFICANT CHANGE UP (ref 4.2–5.8)
RBC # FLD: 13.9 % — SIGNIFICANT CHANGE UP (ref 10.3–14.5)
SODIUM SERPL-SCNC: 137 MMOL/L — SIGNIFICANT CHANGE UP (ref 135–145)
WBC # BLD: 10.48 K/UL — SIGNIFICANT CHANGE UP (ref 3.8–10.5)
WBC # FLD AUTO: 10.48 K/UL — SIGNIFICANT CHANGE UP (ref 3.8–10.5)

## 2025-05-28 PROCEDURE — 99223 1ST HOSP IP/OBS HIGH 75: CPT

## 2025-05-28 PROCEDURE — 93010 ELECTROCARDIOGRAM REPORT: CPT

## 2025-05-28 RX ORDER — NICOTINE POLACRILEX 4 MG/1
1 GUM, CHEWING ORAL DAILY
Refills: 0 | Status: DISCONTINUED | OUTPATIENT
Start: 2025-05-28 | End: 2025-06-04

## 2025-05-28 RX ORDER — MAGNESIUM SULFATE 500 MG/ML
1 SYRINGE (ML) INJECTION ONCE
Refills: 0 | Status: COMPLETED | OUTPATIENT
Start: 2025-05-28 | End: 2025-05-28

## 2025-05-28 RX ADMIN — Medication 0.5 MILLIGRAM(S): at 00:52

## 2025-05-28 RX ADMIN — HEPARIN SODIUM 5000 UNIT(S): 1000 INJECTION INTRAVENOUS; SUBCUTANEOUS at 21:06

## 2025-05-28 RX ADMIN — Medication 0.5 MILLIGRAM(S): at 21:35

## 2025-05-28 RX ADMIN — Medication 100 MILLIGRAM(S): at 21:05

## 2025-05-28 RX ADMIN — NICOTINE POLACRILEX 1 PATCH: 4 GUM, CHEWING ORAL at 19:12

## 2025-05-28 RX ADMIN — Medication 4 MILLIGRAM(S): at 14:24

## 2025-05-28 RX ADMIN — Medication 0.5 MILLIGRAM(S): at 14:39

## 2025-05-28 RX ADMIN — Medication 100 GRAM(S): at 11:08

## 2025-05-28 RX ADMIN — Medication 0.5 MILLIGRAM(S): at 00:23

## 2025-05-28 RX ADMIN — Medication 0.5 MILLIGRAM(S): at 09:01

## 2025-05-28 RX ADMIN — NICOTINE POLACRILEX 1 PATCH: 4 GUM, CHEWING ORAL at 18:15

## 2025-05-28 RX ADMIN — Medication 0.5 MILLIGRAM(S): at 14:24

## 2025-05-28 RX ADMIN — HEPARIN SODIUM 5000 UNIT(S): 1000 INJECTION INTRAVENOUS; SUBCUTANEOUS at 06:43

## 2025-05-28 RX ADMIN — Medication 0.5 MILLIGRAM(S): at 21:15

## 2025-05-28 RX ADMIN — Medication 0.5 MILLIGRAM(S): at 08:46

## 2025-05-28 RX ADMIN — Medication 25 GRAM(S): at 07:03

## 2025-05-28 RX ADMIN — HEPARIN SODIUM 5000 UNIT(S): 1000 INJECTION INTRAVENOUS; SUBCUTANEOUS at 15:42

## 2025-05-28 RX ADMIN — Medication 25 GRAM(S): at 23:33

## 2025-05-28 RX ADMIN — Medication 25 GRAM(S): at 15:42

## 2025-05-28 NOTE — CONSULT NOTE ADULT - PROVIDER SPECIALTY LIST ADULT
Internal Medicine This was a shared visit with the FRANCISCO. I reviewed and verified the documentation.

## 2025-05-28 NOTE — CONSULT NOTE ADULT - ASSESSMENT
28 y/o M with PMH of substance use admitted for gastric perforation s/p ex lap, alec patch repair of anterior gastric perf, abdominal washout, EGD    Post op state  Patient reports pain controlled, no flatus, no BM, NGT in place. Malagon in place  Currently on Pip/Tazo 3.375 and Fluconazole Please get EKG to monitor QTc while on Fluconazole  Pain management per primary team, gentle IVF, monitor electrolytes   TOV per primary team    Substance use disorder  Patient reports smoking crack cocaine, denies Opioid use. Utox positive for Fentanyl. Currently without signs of withdrawal. COWS q6h, requesting Nicotine patch  Please notify medicine if patient with withdrawal symptoms      Possible HIV exposure  Patient reports needle tick injury in February, was started on prophylaxis but lost to follow-up  Noted with some lymphopenia on cbc, get HIV testing( verbal consent obtained)    DVT ppx: SQH  Discussed with primary team

## 2025-05-28 NOTE — PROGRESS NOTE ADULT - SUBJECTIVE AND OBJECTIVE BOX
POST-OP DAY: #1 s/p exlap, alec patch repair of anterior gastric perf, abdominal washout, EGD     SUBJECTIVE: Patient seen and examined bedside by chief resident.    fluconAZOLE IVPB 400 milliGRAM(s) IV Intermittent every 24 hours  heparin   Injectable 5000 Unit(s) SubCutaneous every 8 hours  piperacillin/tazobactam IVPB.. 3.375 Gram(s) IV Intermittent every 8 hours    MEDICATIONS  (PRN):  acetaminophen   IVPB .. 1000 milliGRAM(s) IV Intermittent once PRN Temp greater or equal to 38C (100.4F), Mild Pain (1 - 3), Moderate Pain (4 - 6)  HYDROmorphone  Injectable 0.5 milliGRAM(s) IV Push every 4 hours PRN Severe Pain (7 - 10)  HYDROmorphone  Injectable 0.25 milliGRAM(s) IV Push every 4 hours PRN Moderate Pain (4 - 6)  ondansetron Injectable 4 milliGRAM(s) IV Push every 6 hours PRN Nausea and/or Vomiting      I&O's Detail    27 May 2025 07:01  -  28 May 2025 06:12  --------------------------------------------------------  IN:    Lactated Ringers: 225 mL    Lactated Ringers: 420 mL  Total IN: 645 mL    OUT:    Indwelling Catheter - Urethral (mL): 750 mL    Nasogastric/Oral tube (mL): 100 mL  Total OUT: 850 mL    Total NET: -205 mL          Vital Signs Last 24 Hrs  T(C): 36.7 (28 May 2025 05:34), Max: 37.3 (27 May 2025 19:14)  T(F): 98 (28 May 2025 05:34), Max: 99.2 (27 May 2025 23:57)  HR: 102 (28 May 2025 05:34) (60 - 102)  BP: 155/92 (28 May 2025 05:34) (131/84 - 155/92)  BP(mean): 112 (27 May 2025 23:30) (111 - 118)  RR: 18 (28 May 2025 05:34) (14 - 23)  SpO2: 98% (28 May 2025 05:34) (96% - 100%)    Parameters below as of 28 May 2025 05:34  Patient On (Oxygen Delivery Method): room air        General: NAD, resting comfortably in bed  C/V: NSR  Pulm: Nonlabored breathing, no respiratory distress  Abd: soft, NT/ND  Extrem: WWP, no edema, SCDs in place    LABS:                        14.0   10.58 )-----------( 236      ( 27 May 2025 18:15 )             40.9     05-27    138  |  107  |  10  ----------------------------<  119[H]  4.2   |  21[L]  |  0.80    Ca    8.6      27 May 2025 18:15    TPro  7.4  /  Alb  3.8  /  TBili  0.4  /  DBili  x   /  AST  16  /  ALT  21  /  AlkPhos  72  05-27    PT/INR - ( 27 May 2025 18:15 )   PT: 11.7 sec;   INR: 1.02          PTT - ( 27 May 2025 18:15 )  PTT:27.9 sec  Urinalysis Basic - ( 27 May 2025 18:15 )    Color: x / Appearance: x / SG: x / pH: x  Gluc: 119 mg/dL / Ketone: x  / Bili: x / Urobili: x   Blood: x / Protein: x / Nitrite: x   Leuk Esterase: x / RBC: x / WBC x   Sq Epi: x / Non Sq Epi: x / Bacteria: x        RADIOLOGY & ADDITIONAL STUDIES:  CT Abdomen and Pelvis w/ IV Cont:   ACC: [id number] EXAM:  CT ABDOMEN AND PELVIS IC   ORDERED BY: DARYL ZAMORA     PROCEDURE DATE:  05/27/2025          INTERPRETATION:  CLINICAL INFORMATION: Upper abdominal pain    COMPARISON: None.    CONTRAST/COMPLICATIONS:  IV Contrast: Omnipaque 350  96 cc administered   4 cc discarded  Oral Contrast: NONE  .    PROCEDURE:  CT of the Abdomen and Pelvis was performed.  Sagittal and coronal reformats were performed.    FINDINGS:  LOWER CHEST: Within normal limits.    LIVER: Subcentimeter hypodensity seen peripherally in the right hepatic   lobe, benign-appearing  BILE DUCTS: Normal caliber.  GALLBLADDER: Within normal limits.  SPLEEN: Within normal limits.  PANCREAS: Within normal limits.  ADRENALS: Within normal limits.  KIDNEYS/URETERS:Within normal limits.    BLADDER: Within normal limits.  REPRODUCTIVE ORGANS: Within normal limits    BOWEL: No bowel obstruction. Appendix is not visualized. Limited   evaluation of the bowel due to the lack of enteric contrast.  PERITONEUM/RETROPERITONEUM: Scattered dependent and nondependent foci of   free air are noted, particularly along the midline anterior peritoneum   (2:46). Mild perihepatic fluid and mild fluid is noted in the left   paracolic gutter. No loculated collections to suggestan abscess. These   findings are strongly suggestive of bowel perforation.  VESSELS: Within normal limits.  LYMPH NODES: No lymphadenopathy.  ABDOMINAL WALL: Within normal limits.  BONES: Within normal limits.    IMPRESSION:  Scattered dependent and nondependent foci of free air with mild   perihepatic free fluid and fluid in the left paracolic gutter. The source   is unknown in this limited evaluation without enteric contrast and due to   the patient's thin body habitus.    The above findings were conveyed to Dr. Daryl Zamora in the emergency   department at [facility], at the time of this   dictation.        --- End of Report ---            SADIQ CONTEH MD; Attending Radiologist  This document has been electronically signed. May 27 2025  3:48PM (05-27-25 @ 15:26)     POST-OP DAY: #1 s/p exlap, alec patch repair of anterior gastric perf, abdominal washout, EGD     SUBJECTIVE: Pt seen and examined by chief resident. Pt is doing well, resting comfortably on bed. Pain controlled. Not yet ambulating out of bed. No nausea or vomiting. -F/-BM    fluconAZOLE IVPB 400 milliGRAM(s) IV Intermittent every 24 hours  heparin   Injectable 5000 Unit(s) SubCutaneous every 8 hours  piperacillin/tazobactam IVPB.. 3.375 Gram(s) IV Intermittent every 8 hours    MEDICATIONS  (PRN):  acetaminophen   IVPB .. 1000 milliGRAM(s) IV Intermittent once PRN Temp greater or equal to 38C (100.4F), Mild Pain (1 - 3), Moderate Pain (4 - 6)  HYDROmorphone  Injectable 0.5 milliGRAM(s) IV Push every 4 hours PRN Severe Pain (7 - 10)  HYDROmorphone  Injectable 0.25 milliGRAM(s) IV Push every 4 hours PRN Moderate Pain (4 - 6)  ondansetron Injectable 4 milliGRAM(s) IV Push every 6 hours PRN Nausea and/or Vomiting      I&O's Detail    27 May 2025 07:01  -  28 May 2025 06:12  --------------------------------------------------------  IN:    Lactated Ringers: 225 mL    Lactated Ringers: 420 mL  Total IN: 645 mL    OUT:    Indwelling Catheter - Urethral (mL): 750 mL    Nasogastric/Oral tube (mL): 100 mL  Total OUT: 850 mL    Total NET: -205 mL          Vital Signs Last 24 Hrs  T(C): 36.7 (28 May 2025 05:34), Max: 37.3 (27 May 2025 19:14)  T(F): 98 (28 May 2025 05:34), Max: 99.2 (27 May 2025 23:57)  HR: 102 (28 May 2025 05:34) (60 - 102)  BP: 155/92 (28 May 2025 05:34) (131/84 - 155/92)  BP(mean): 112 (27 May 2025 23:30) (111 - 118)  RR: 18 (28 May 2025 05:34) (14 - 23)  SpO2: 98% (28 May 2025 05:34) (96% - 100%)    Parameters below as of 28 May 2025 05:34  Patient On (Oxygen Delivery Method): room air      Physical Exam:  General: NAD, resting comfortably in bed  C/V: NSR  HEENT: NGT with minimal output  Pulm: Nonlabored breathing, no respiratory distress  Abd: soft, nondistended, incisional tenderness midline dressing with minimal serosanguinous strike through   Extrem: WWP, no edema, SCDs in place    LABS:                        14.0   10.58 )-----------( 236      ( 27 May 2025 18:15 )             40.9     05-27    138  |  107  |  10  ----------------------------<  119[H]  4.2   |  21[L]  |  0.80    Ca    8.6      27 May 2025 18:15    TPro  7.4  /  Alb  3.8  /  TBili  0.4  /  DBili  x   /  AST  16  /  ALT  21  /  AlkPhos  72  05-27    PT/INR - ( 27 May 2025 18:15 )   PT: 11.7 sec;   INR: 1.02          PTT - ( 27 May 2025 18:15 )  PTT:27.9 sec  Urinalysis Basic - ( 27 May 2025 18:15 )    Color: x / Appearance: x / SG: x / pH: x  Gluc: 119 mg/dL / Ketone: x  / Bili: x / Urobili: x   Blood: x / Protein: x / Nitrite: x   Leuk Esterase: x / RBC: x / WBC x   Sq Epi: x / Non Sq Epi: x / Bacteria: x        RADIOLOGY & ADDITIONAL STUDIES:  CT Abdomen and Pelvis w/ IV Cont:   ACC: [id number] EXAM:  CT ABDOMEN AND PELVIS IC   ORDERED BY: DARYL ZAMORA     PROCEDURE DATE:  05/27/2025          INTERPRETATION:  CLINICAL INFORMATION: Upper abdominal pain    COMPARISON: None.    CONTRAST/COMPLICATIONS:  IV Contrast: Omnipaque 350  96 cc administered   4 cc discarded  Oral Contrast: NONE  .    PROCEDURE:  CT of the Abdomen and Pelvis was performed.  Sagittal and coronal reformats were performed.    FINDINGS:  LOWER CHEST: Within normal limits.    LIVER: Subcentimeter hypodensity seen peripherally in the right hepatic   lobe, benign-appearing  BILE DUCTS: Normal caliber.  GALLBLADDER: Within normal limits.  SPLEEN: Within normal limits.  PANCREAS: Within normal limits.  ADRENALS: Within normal limits.  KIDNEYS/URETERS:Within normal limits.    BLADDER: Within normal limits.  REPRODUCTIVE ORGANS: Within normal limits    BOWEL: No bowel obstruction. Appendix is not visualized. Limited   evaluation of the bowel due to the lack of enteric contrast.  PERITONEUM/RETROPERITONEUM: Scattered dependent and nondependent foci of   free air are noted, particularly along the midline anterior peritoneum   (2:46). Mild perihepatic fluid and mild fluid is noted in the left   paracolic gutter. No loculated collections to suggestan abscess. These   findings are strongly suggestive of bowel perforation.  VESSELS: Within normal limits.  LYMPH NODES: No lymphadenopathy.  ABDOMINAL WALL: Within normal limits.  BONES: Within normal limits.    IMPRESSION:  Scattered dependent and nondependent foci of free air with mild   perihepatic free fluid and fluid in the left paracolic gutter. The source   is unknown in this limited evaluation without enteric contrast and due to   the patient's thin body habitus.    The above findings were conveyed to Dr. Daryl Zamora in the emergency   department at [facility], at the time of this   dictation.        --- End of Report ---            SADIQ CONTEH MD; Attending Radiologist  This document has been electronically signed. May 27 2025  3:48PM (05-27-25 @ 15:26)

## 2025-05-28 NOTE — DISCHARGE NOTE PROVIDER - NSDCDCMDCOMP_GEN_ALL_CORE
This document is complete and the patient is ready for discharge.
[Follow-Up] : a follow-up evaluation of

## 2025-05-28 NOTE — DISCHARGE NOTE PROVIDER - NSDCCPTREATMENT_GEN_ALL_CORE_FT
PRINCIPAL PROCEDURE  Procedure: Gastric perforation repair  Findings and Treatment:       SECONDARY PROCEDURE  Procedure: Exploratory laparoscopy  Findings and Treatment:     Procedure: Repair of perforated pyloric ulcer using Buck patch  Findings and Treatment:     Procedure: EGD, in operating room  Findings and Treatment:

## 2025-05-28 NOTE — DISCHARGE NOTE PROVIDER - INSTRUCTIONS
Full Liquid Diet:    Coffee, tea, and other hot beverages  Gelatin   Gravy  Ice cream, sherbet, sorbet  Ice cubes, ice chips  Milk, liquid creamer  Nutritional supplements  Popsicles  Vegetable and fruit juices; fluid in canned fruit (fruit itself is not allowed)  Yogurt (without nuts, seeds, or fruit)  Soft drinks, lemonade, limeade  Soups (strained)  Syrup

## 2025-05-28 NOTE — DISCHARGE NOTE PROVIDER - NSDCMRMEDTOKEN_GEN_ALL_CORE_FT
azithromycin 250 mg oral tablet: 1 tab(s) orally once a day  bacitracin zinc 500 units/g topical ointment: Apply topically to affected area 3 times a day  cephalexin 500 mg oral tablet: 1 tab(s) orally 4 times a day   dolutegravir 50 mg oral tablet: 1 tab(s) orally once a day   doxycycline hyclate 100 mg oral tablet: 1 tab(s) orally 2 times a day   emtricitabine-tenofovir disoproxil 200 mg-300 mg oral tablet: 1 tab(s) orally once a day   hydrocortisone 1% topical cream: Apply topically to affected area 3 times a day  ibuprofen 600 mg oral tablet: 1 tab(s) orally every 6 hours, As Needed for pain/fever  Isentress 400 mg oral tablet: 1 tab(s) orally 2 times a day  Truvada 200 mg-300 mg oral tablet: 1 tab(s) orally once a day   dolutegravir 50 mg oral tablet: 1 tab(s) orally once a day   emtricitabine-tenofovir disoproxil 200 mg-300 mg oral tablet: 1 tab(s) orally once a day   Isentress 400 mg oral tablet: 1 tab(s) orally 2 times a day  Protonix 40 mg oral delayed release tablet: 1 tab(s) orally every 12 hours  Truvada 200 mg-300 mg oral tablet: 1 tab(s) orally once a day

## 2025-05-28 NOTE — CONSULT NOTE ADULT - SUBJECTIVE AND OBJECTIVE BOX
28 yo M with ni reported PMH, PSH of appendectomy, presents from [facility] reporting sharp abd pain to epigastrium/LUQ which began today, w/ radiation to RUQ, pt denies any nausea, vomiting, fever, chills, diarrhea, CP, SOB. In the ED, pt afebrile, nontachycardic, normotensive, and satting on RA. On exam, abd diffusely tender to palpation, nondistended. Labs demonstrate WBC 10, hgb 15.3, LA 0.8, CMP WNL. CTAP showing scattered foci of free air w/ perihepatic free fluid in LT paracolic gutter, concerning for possible bowel perforation. Pt reports intermittent meth/cocaine use, last use x3 days ago, no reported sx of withdrawal    PMH: denies  PSHx: Appendectomy  Medications: denies   Allergies: Peanuts  Social Hx: Lives in shelter, [address], smokes meth/cocaine use, last use Saturday. Denies using Opioids, unsure if drugs cut with Fentanyl, denies previous withdrawal, reports smoking cigarettes, denies alcohol use.  Family Hx: Denies known family history    INTERVAL EVENTS:  POD 1  s/p exlap, alec patch repair of anterior gastric perf, abdominal washout    SUBJECTIVE:  Patient was seen and examined at bedside. Reports pain controlled, NGT in place, denies flatus, no BM, tired, wants to sleep. Patient reports being of post HIV ppx after being stuck with a needle, he reports was never tested post ppx, gave verbal consent for testing this admission. No other complaints or events reported. Telemetry reviewed.     Review of systems: No fever, chills, dizziness, HA, Changes in vision, CP, dyspnea, nausea or vomiting, dysuria, Rest of 12 point Review of systems negative unless otherwise documented elsewhere in note.     Diet, NPO (05-27-25 @ 18:33) [Active]      MEDICATIONS:  MEDICATIONS  (STANDING):  BUpivacaine liposome 1.3% Injectable (no eMAR) 20 milliLiter(s) Local Injection once  fluconAZOLE IVPB 400 milliGRAM(s) IV Intermittent every 24 hours  heparin   Injectable 5000 Unit(s) SubCutaneous every 8 hours  lactated ringers. 1000 milliLiter(s) (140 mL/Hr) IV Continuous <Continuous>  magnesium sulfate  IVPB 1 Gram(s) IV Intermittent once  nicotine -   7 mG/24Hr(s) Patch 1 Patch Transdermal daily  piperacillin/tazobactam IVPB.. 3.375 Gram(s) IV Intermittent every 8 hours    MEDICATIONS  (PRN):  acetaminophen   IVPB .. 1000 milliGRAM(s) IV Intermittent once PRN Temp greater or equal to 38C (100.4F), Mild Pain (1 - 3), Moderate Pain (4 - 6)  HYDROmorphone  Injectable 0.5 milliGRAM(s) IV Push every 4 hours PRN Severe Pain (7 - 10)  HYDROmorphone  Injectable 0.25 milliGRAM(s) IV Push every 4 hours PRN Moderate Pain (4 - 6)  ondansetron Injectable 4 milliGRAM(s) IV Push every 6 hours PRN Nausea and/or Vomiting      Allergies    peanuts (Anaphylaxis)  No Known Drug Allergies  Nuts (Hives)    Intolerances        OBJECTIVE:  Vital Signs Last 24 Hrs  T(C): 36.7 (28 May 2025 08:35), Max: 37.3 (27 May 2025 19:14)  T(F): 98.1 (28 May 2025 08:35), Max: 99.2 (27 May 2025 23:57)  HR: 82 (28 May 2025 08:35) (60 - 102)  BP: 136/68 (28 May 2025 08:35) (131/84 - 155/92)  BP(mean): 112 (27 May 2025 23:30) (111 - 118)  RR: 16 (28 May 2025 08:35) (14 - 23)  SpO2: 97% (28 May 2025 08:35) (96% - 100%)    Parameters below as of 28 May 2025 08:35  Patient On (Oxygen Delivery Method): room air      I&O's Summary    27 May 2025 07:01  -  28 May 2025 07:00  --------------------------------------------------------  IN: 1205 mL / OUT: 850 mL / NET: 355 mL    28 May 2025 07:01  -  28 May 2025 10:27  --------------------------------------------------------  IN: 140 mL / OUT: 0 mL / NET: 140 mL        PHYSICAL EXAM:  General: Initially sleeping, awakes to voice, speaking in full sentences, no labored breathing on RA  HEENT: AT/NC, no facial asymmetry, NGT in place   Lungs: no crackles, no wheezes  Heart: regualr  Abdomen: limited exam, no tenderness, unable to assess BS  Extremities: warm, no edema, no calf tenderness, no tenderness, no focal deficit       LABS:                        14.1   10.48 )-----------( 263      ( 28 May 2025 07:19 )             41.5     05-28    137  |  104  |  9   ----------------------------<  124[H]  4.6   |  22  |  0.96    Ca    8.8      28 May 2025 07:19  Phos  3.9     05-28  Mg     1.7     05-28    TPro  7.4  /  Alb  3.8  /  TBili  0.4  /  DBili  x   /  AST  16  /  ALT  21  /  AlkPhos  72  05-27    LIVER FUNCTIONS - ( 27 May 2025 13:25 )  Alb: 3.8 g/dL / Pro: 7.4 g/dL / ALK PHOS: 72 U/L / ALT: 21 U/L / AST: 16 U/L / GGT: x           PT/INR - ( 27 May 2025 18:15 )   PT: 11.7 sec;   INR: 1.02          PTT - ( 27 May 2025 18:15 )  PTT:27.9 sec  CAPILLARY BLOOD GLUCOSE      POCT Blood Glucose.: 71 mg/dL (27 May 2025 12:53)    Urinalysis Basic - ( 28 May 2025 07:19 )    Color: x / Appearance: x / SG: x / pH: x  Gluc: 124 mg/dL / Ketone: x  / Bili: x / Urobili: x   Blood: x / Protein: x / Nitrite: x   Leuk Esterase: x / RBC: x / WBC x   Sq Epi: x / Non Sq Epi: x / Bacteria: x        MICRODATA:      RADIOLOGY/OTHER STUDIES:

## 2025-05-28 NOTE — PROGRESS NOTE ADULT - ASSESSMENT
29M PSHx appendectomy, admitted for findings of scattered foci of free air concerning for possible bowel perforation, physical exam remarkable for significant tenderness to palpation diffusely. Taken to OR urgently. Now s/p exlap, alec patch repair of anterior gastric perf, abdominal washout, EGD (5/27).     NPO/IVF  NGT to LIWS  Fluc/Zosyn  Pain/nausea PRN  Malagon  SCDs/SQH/OOBA  AM labs  HIV ID informal consult in AM - has been ordered for HIV meds but is recently negative, denies having HIV - do they suggest further testing?  AROBF 29M PSHx appendectomy, admitted for findings of scattered foci of free air concerning for possible bowel perforation, physical exam remarkable for significant tenderness to palpation diffusely. Taken to OR urgently. Now s/p exlap, alec patch repair of anterior gastric perf, abdominal washout, EGD (5/27).     NPO/IVF  NGT to LIWS  Fluc/Zosyn  Pain/nausea PRN  Malagon  SCDs/SQH/OOBA  AM labs

## 2025-05-28 NOTE — PROGRESS NOTE ADULT - SUBJECTIVE AND OBJECTIVE BOX
General Surgery Post-Op Note    Procedure: exlap, alec patch repair of anterior gastric perf, abdominal washout, EGD    Diagnosis/Indication: gastric perforation    Surgeon: Dr. Jacobson    S: Pt has no complaints. Denies headache, dizziness, CP, SOB, VELEZ, calf tenderness, n/v. Pain controlled with medication. Patient just got pain medication and is sleepy but arousable, responding to commands.     O:  T(C): 37.3 (05-27-25 @ 23:57), Max: 37.3 (05-27-25 @ 23:57)  T(F): 99.2 (05-27-25 @ 23:57), Max: 99.2 (05-27-25 @ 23:57)  HR: 84 (05-27-25 @ 23:57) (73 - 85)  BP: 140/94 (05-27-25 @ 23:57) (139/96 - 152/93)  RR: 16 (05-27-25 @ 23:57) (14 - 23)  SpO2: 96% (05-27-25 @ 23:57) (96% - 100%)  Wt(kg): --                        14.0   10.58 )-----------( 236      ( 27 May 2025 18:15 )             40.9     05-27    138  |  107  |  10  ----------------------------<  119[H]  4.2   |  21[L]  |  0.80    Ca    8.6      27 May 2025 18:15    TPro  7.4  /  Alb  3.8  /  TBili  0.4  /  DBili  x   /  AST  16  /  ALT  21  /  AlkPhos  72  05-27      Gen: NAD, resting comfortably in bed  C/V: NSR  Pulm: Nonlabored breathing, no respiratory distress  Abd: abdominal binder in place, abdomen soft, NT, mildly distended           Incisions: mildline laparotomy incision with dressing with minimal strikethrough           NGT: in place to LIWS with scant gastric contents  Extrem: WWP, no calf edema, SCDs in place      A/P: 29yMale s/p ex lap with gastric alec patch repair. Doing well postoperatively.   NPO/IVF  NGT to LIWS  Fluc/Zosyn  Pain/nausea PRN  Malagon  SCDs/SQH/OSHAHZAD  AM labs

## 2025-05-28 NOTE — DISCHARGE NOTE PROVIDER - NSDCFUADDAPPT_GEN_ALL_CORE_FT
Please follow up with Dr. Jacobson in one week; you may call the office to make an appointment at your earliest convenience [phone].

## 2025-05-28 NOTE — DISCHARGE NOTE PROVIDER - CARE PROVIDER_API CALL
Nika Jacobson  Surgery  [address], [address]  [place] [address]  Phone: [phone]  Fax: [phone]  Follow Up Time:    Nika Jacobson  Surgery  [address], [address]  [place] [address]  Phone: [phone]  Fax: [phone]  Follow Up Time: 1 week

## 2025-05-28 NOTE — DISCHARGE NOTE PROVIDER - HOSPITAL COURSE
30yo Male pt with PMH PNA, PSHx appendectomy, presented from [facility] reporting sharp abd pain to epigastrium/LUQ which day of presentation, w/ radiation to RUQ. Patient denied any nausea, vomiting, fever, chills, diarrhea, CP, SOB. In the ED, pt was afebrile, nontachycardic, normotensive, and satting on RA. On exam, abd was diffusely tender to palpation. Labs with WBC 10, hgb 15.3, LA 0.8, CMP WNL. CTAP showed scattered foci of free air w/ perihepatic free fluid in LT paracolic gutter, concerning for possible bowel perforation. Patient reported intermittent meth/cocaine use, last used 3 days prior to presentation with no reported symptoms of withdrawal. Patient was admitted and emergently        5/27: exlap, alec patch repair of anterior gastric perf, abdominal washout, EGD, IVF 1.7L, 300 UOP, Cefotetan     5/28: dc blackwell, pTOV  ON: s/p ex lap, gastric alec patch repair. POC WNL. NGT minimal output.  5/27: admitted for findings of free air on CT, c/f possible bowel perforation, added on to OR. 30yo Male pt with PMH PNA, PSHx appendectomy, presented from [facility] reporting sharp abd pain to epigastrium/LUQ which day of presentation, w/ radiation to RUQ. Patient denied any nausea, vomiting, fever, chills, diarrhea, CP, SOB. In the ED, pt was afebrile, nontachycardic, normotensive, and satting on RA. On exam, abd was diffusely tender to palpation. Labs with WBC 10, hgb 15.3, LA 0.8, CMP WNL. CTAP showed scattered foci of free air w/ perihepatic free fluid in LT paracolic gutter, concerning for possible bowel perforation. Patient reported intermittent meth/cocaine use, last used 3 days prior to presentation with no reported symptoms of withdrawal. Patient was admitted and taken to the OR urgently. Pt is now s/p ex lap, alec patch repair of anterior gastric perforation, abdominal washout, EGD (5/27). Pt tolerated the procedure well. NGT remained in place perioperatively. On POD3, pt reports +flatus and UGI series was performed that showed no gastric leak and contrast reaching ____       **INCOMPLETE 5/30 28yo Male pt with PMH PNA, PSHx appendectomy, presented from [facility] reporting sharp abd pain to epigastrium/LUQ which day of presentation, w/ radiation to RUQ. Patient denied any nausea, vomiting, fever, chills, diarrhea, CP, SOB. In the ED, pt was afebrile, nontachycardic, normotensive, and satting on RA. On exam, abd was diffusely tender to palpation. Labs with WBC 10, hgb 15.3, LA 0.8, CMP WNL. CTAP showed scattered foci of free air w/ perihepatic free fluid in LT paracolic gutter, concerning for possible bowel perforation. Patient reported intermittent meth/cocaine use, last used 3 days prior to presentation with no reported symptoms of withdrawal. Patient was admitted and taken to the OR urgently. Pt is now s/p ex lap, alec patch repair of anterior gastric perforation, abdominal washout, EGD (5/27). Pt tolerated the procedure well. NGT remained in place perioperatively. On POD3, pt reports +flatus and UGI series was performed that showed no gastric leak and contrast reaching ___      **INCOMPLETE 5/30    30yo Male pt with PMH PNA, PSHx appendectomy, presented from [facility] reporting sharp abd pain to epigastrium/LUQ which day of presentation, w/ radiation to RUQ. Patient denied any nausea, vomiting, fever, chills, diarrhea, CP, SOB. In the ED, pt was afebrile, nontachycardic, normotensive, and satting on RA. On exam, abd was diffusely tender to palpation. Labs with WBC 10, hgb 15.3, LA 0.8, CMP WNL. CTAP showed scattered foci of free air w/ perihepatic free fluid in LT paracolic gutter, concerning for possible bowel perforation. Patient reported intermittent meth/cocaine use, last used 3 days prior to presentation with no reported symptoms of withdrawal. Patient was admitted and taken to the OR urgently. Pt is now s/p ex lap, alec patch repair of anterior gastric perforation, abdominal washout, EGD (5/27). Pt tolerated the procedure well. NGT remained in place perioperatively. On POD3, pt reports +flatus and UGI series was performed that showed no gastric leak and contrast reaching the small bowel and colon. POD #4 NGT was clamped for a trial, patient tolerated well, without nausea or vomiting, residual output was 100cc and the tube was removed. POD #5, patient had return of flatus and diet was advanced. POD #6, patient had a bowel movement and today, POD #6, the patient is feeling well, vital signs are stable, labs are within normal limits. Pain is well controlled on oral pain medication. The patient is tolerating a diet without nausea, and ambulating independently.  Patient is stable and ready for discharge today.       **INCOMPLETE 5/30    30yo Male pt with PMH PNA, PSHx appendectomy, presented from [facility] reporting sharp abd pain to epigastrium/LUQ which day of presentation, w/ radiation to RUQ. Patient denied any nausea, vomiting, fever, chills, diarrhea, CP, SOB. In the ED, pt was afebrile, nontachycardic, normotensive, and satting on RA. On exam, abd was diffusely tender to palpation. Labs with WBC 10, hgb 15.3, LA 0.8, CMP WNL. CTAP showed scattered foci of free air w/ perihepatic free fluid in LT paracolic gutter, concerning for possible bowel perforation. Patient reported intermittent meth/cocaine use, last used 3 days prior to presentation with no reported symptoms of withdrawal. Patient was admitted and taken to the OR urgently. Pt is now s/p ex lap, alec patch repair of anterior gastric perforation, abdominal washout, EGD (5/27). Pt tolerated the procedure well. NGT remained in place perioperatively. On POD3, pt reports +flatus and UGI series was performed that showed no gastric leak and contrast reaching the small bowel and colon. POD #4 NGT was clamped for a trial, patient tolerated well, without nausea or vomiting, residual output was 100cc and the tube was removed. POD #5, patient had return of flatus and diet was advanced. POD #6, patient had a bowel movement and today, POD #6, the patient is feeling well, vital signs are stable, labs are within normal limits. Pain is well controlled on oral pain medication. The patient is tolerating a diet without nausea, and ambulating independently.  Patient is stable and ready for discharge today on 6/4.

## 2025-05-28 NOTE — CONSULT NOTE ADULT - TIME BILLING
Bedside exam and interview   Reviewed vitals, labs, imaging, HIE  Discussed patient's plan of care with primary team   Documentation of encounter  Time spent on the encounter excludes teaching and separately reported services

## 2025-05-29 LAB
ANION GAP SERPL CALC-SCNC: 11 MMOL/L — SIGNIFICANT CHANGE UP (ref 5–17)
BUN SERPL-MCNC: 11 MG/DL — SIGNIFICANT CHANGE UP (ref 7–23)
CALCIUM SERPL-MCNC: 8.8 MG/DL — SIGNIFICANT CHANGE UP (ref 8.4–10.5)
CHLORIDE SERPL-SCNC: 104 MMOL/L — SIGNIFICANT CHANGE UP (ref 96–108)
CO2 SERPL-SCNC: 21 MMOL/L — LOW (ref 22–31)
CREAT SERPL-MCNC: 1.05 MG/DL — SIGNIFICANT CHANGE UP (ref 0.5–1.3)
EGFR: 98 ML/MIN/1.73M2 — SIGNIFICANT CHANGE UP
EGFR: 98 ML/MIN/1.73M2 — SIGNIFICANT CHANGE UP
GLUCOSE SERPL-MCNC: 77 MG/DL — SIGNIFICANT CHANGE UP (ref 70–99)
HCT VFR BLD CALC: 45.2 % — SIGNIFICANT CHANGE UP (ref 39–50)
HGB BLD-MCNC: 14.9 G/DL — SIGNIFICANT CHANGE UP (ref 13–17)
HIV 1+2 AB+HIV1 P24 AG SERPL QL IA: SIGNIFICANT CHANGE UP
MAGNESIUM SERPL-MCNC: 2.1 MG/DL — SIGNIFICANT CHANGE UP (ref 1.6–2.6)
MCHC RBC-ENTMCNC: 26.1 PG — LOW (ref 27–34)
MCHC RBC-ENTMCNC: 33 G/DL — SIGNIFICANT CHANGE UP (ref 32–36)
MCV RBC AUTO: 79.2 FL — LOW (ref 80–100)
NRBC BLD AUTO-RTO: 0 /100 WBCS — SIGNIFICANT CHANGE UP (ref 0–0)
PHOSPHATE SERPL-MCNC: 2.5 MG/DL — SIGNIFICANT CHANGE UP (ref 2.5–4.5)
PLATELET # BLD AUTO: 223 K/UL — SIGNIFICANT CHANGE UP (ref 150–400)
POTASSIUM SERPL-MCNC: 4.4 MMOL/L — SIGNIFICANT CHANGE UP (ref 3.5–5.3)
POTASSIUM SERPL-SCNC: 4.4 MMOL/L — SIGNIFICANT CHANGE UP (ref 3.5–5.3)
RBC # BLD: 5.71 M/UL — SIGNIFICANT CHANGE UP (ref 4.2–5.8)
RBC # FLD: 14.1 % — SIGNIFICANT CHANGE UP (ref 10.3–14.5)
SODIUM SERPL-SCNC: 136 MMOL/L — SIGNIFICANT CHANGE UP (ref 135–145)
WBC # BLD: 6.46 K/UL — SIGNIFICANT CHANGE UP (ref 3.8–10.5)
WBC # FLD AUTO: 6.46 K/UL — SIGNIFICANT CHANGE UP (ref 3.8–10.5)

## 2025-05-29 PROCEDURE — 99233 SBSQ HOSP IP/OBS HIGH 50: CPT

## 2025-05-29 PROCEDURE — 93010 ELECTROCARDIOGRAM REPORT: CPT

## 2025-05-29 RX ORDER — SODIUM PHOSPHATE,DIBASIC DIHYD
15 POWDER (GRAM) MISCELLANEOUS ONCE
Refills: 0 | Status: COMPLETED | OUTPATIENT
Start: 2025-05-29 | End: 2025-05-29

## 2025-05-29 RX ADMIN — Medication 25 GRAM(S): at 23:44

## 2025-05-29 RX ADMIN — Medication 1 LOZENGE: at 12:43

## 2025-05-29 RX ADMIN — NICOTINE POLACRILEX 1 PATCH: 4 GUM, CHEWING ORAL at 14:07

## 2025-05-29 RX ADMIN — Medication 62.5 MILLIMOLE(S): at 10:25

## 2025-05-29 RX ADMIN — Medication 0.5 MILLIGRAM(S): at 06:29

## 2025-05-29 RX ADMIN — NICOTINE POLACRILEX 1 PATCH: 4 GUM, CHEWING ORAL at 06:20

## 2025-05-29 RX ADMIN — Medication 0.5 MILLIGRAM(S): at 17:48

## 2025-05-29 RX ADMIN — Medication 0.5 MILLIGRAM(S): at 21:50

## 2025-05-29 RX ADMIN — Medication 0.5 MILLIGRAM(S): at 13:00

## 2025-05-29 RX ADMIN — HEPARIN SODIUM 5000 UNIT(S): 1000 INJECTION INTRAVENOUS; SUBCUTANEOUS at 06:28

## 2025-05-29 RX ADMIN — Medication 0.5 MILLIGRAM(S): at 02:45

## 2025-05-29 RX ADMIN — HEPARIN SODIUM 5000 UNIT(S): 1000 INJECTION INTRAVENOUS; SUBCUTANEOUS at 21:25

## 2025-05-29 RX ADMIN — Medication 1 LOZENGE: at 06:30

## 2025-05-29 RX ADMIN — Medication 40 MILLIGRAM(S): at 11:38

## 2025-05-29 RX ADMIN — Medication 25 GRAM(S): at 07:07

## 2025-05-29 RX ADMIN — NICOTINE POLACRILEX 1 PATCH: 4 GUM, CHEWING ORAL at 18:08

## 2025-05-29 RX ADMIN — Medication 100 MILLIGRAM(S): at 21:58

## 2025-05-29 RX ADMIN — SODIUM CHLORIDE 140 MILLILITER(S): 9 INJECTION, SOLUTION INTRAVENOUS at 20:37

## 2025-05-29 RX ADMIN — Medication 0.5 MILLIGRAM(S): at 06:50

## 2025-05-29 RX ADMIN — Medication 25 GRAM(S): at 15:35

## 2025-05-29 RX ADMIN — HEPARIN SODIUM 5000 UNIT(S): 1000 INJECTION INTRAVENOUS; SUBCUTANEOUS at 13:36

## 2025-05-29 RX ADMIN — Medication 0.5 MILLIGRAM(S): at 12:44

## 2025-05-29 RX ADMIN — Medication 0.5 MILLIGRAM(S): at 02:25

## 2025-05-29 RX ADMIN — Medication 0.5 MILLIGRAM(S): at 17:32

## 2025-05-29 RX ADMIN — NICOTINE POLACRILEX 1 PATCH: 4 GUM, CHEWING ORAL at 11:38

## 2025-05-29 RX ADMIN — Medication 1 LOZENGE: at 20:51

## 2025-05-29 RX ADMIN — Medication 0.5 MILLIGRAM(S): at 21:34

## 2025-05-29 NOTE — PROGRESS NOTE ADULT - ASSESSMENT
28 y/o M with PMH of substance use admitted for gastric perforation s/p ex lap, alec patch repair of anterior gastric perf, abdominal washout, EGD    Post op state  Patient reports pain controlled, no flatus, no BM, NGT in place.   Currently on Pip/Tazo 3.375 and Fluconazole. Monitor QTc while on Fluconazole  Pain management per primary team, gentle IVF, monitor electrolytes   TOV per primary team    Substance use disorder  Patient reports smoking crack cocaine, denies Opioid use. Utox positive for Fentanyl. Currently without signs of withdrawal. COWS q6h  Please notify medicine if patient with withdrawal symptoms      Possible HIV exposure  Patient reports needle tick injury in February, was started on prophylaxis but lost to follow-up  Noted with some lymphopenia on cbc, get HIV testing( verbal consent obtained)    DVT ppx: SQH  Discussed with primary team

## 2025-05-29 NOTE — PROGRESS NOTE ADULT - SUBJECTIVE AND OBJECTIVE BOX
SUBJECTIVE:  Patient was seen and examined at bedside. Patient reports pain controlled, denies SOB, no chest pain, no flatus, no BM, NGT remains in place. Did not ambulate since OR. No other complaints or events reported    Review of systems: No fever, chills, dizziness, HA, Changes in vision, CP, dyspnea, nausea or vomiting, dysuria, LE edema. Rest of 12 point Review of systems negative unless otherwise documented elsewhere in note.     Diet, NPO (05-27-25 @ 18:33) [Active]      MEDICATIONS:  MEDICATIONS  (STANDING):  fluconAZOLE IVPB 400 milliGRAM(s) IV Intermittent every 24 hours  heparin   Injectable 5000 Unit(s) SubCutaneous every 8 hours  lactated ringers. 1000 milliLiter(s) (140 mL/Hr) IV Continuous <Continuous>  nicotine -   7 mG/24Hr(s) Patch 1 Patch Transdermal daily  pantoprazole  Injectable 40 milliGRAM(s) IV Push daily  piperacillin/tazobactam IVPB.. 3.375 Gram(s) IV Intermittent every 8 hours    MEDICATIONS  (PRN):  acetaminophen   IVPB .. 1000 milliGRAM(s) IV Intermittent once PRN Temp greater or equal to 38C (100.4F), Mild Pain (1 - 3), Moderate Pain (4 - 6)  benzocaine/menthol Lozenge 1 Lozenge Oral every 4 hours PRN Sore Throat  HYDROmorphone  Injectable 0.5 milliGRAM(s) IV Push every 4 hours PRN Severe Pain (7 - 10)  HYDROmorphone  Injectable 0.25 milliGRAM(s) IV Push every 4 hours PRN Moderate Pain (4 - 6)  ondansetron Injectable 4 milliGRAM(s) IV Push every 6 hours PRN Nausea and/or Vomiting      Allergies    peanuts (Anaphylaxis)  No Known Drug Allergies  Nuts (Hives)    Intolerances        OBJECTIVE:  Vital Signs Last 24 Hrs  T(C): 37.3 (29 May 2025 08:41), Max: 37.5 (29 May 2025 00:13)  T(F): 99.1 (29 May 2025 08:41), Max: 99.5 (29 May 2025 00:13)  HR: 73 (29 May 2025 08:41) (68 - 89)  BP: 151/78 (29 May 2025 08:41) (128/72 - 151/78)  BP(mean): --  RR: 16 (29 May 2025 08:41) (16 - 18)  SpO2: 98% (29 May 2025 08:41) (94% - 98%)    Parameters below as of 29 May 2025 08:41  Patient On (Oxygen Delivery Method): room air      I&O's Summary    28 May 2025 07:01  -  29 May 2025 07:00  --------------------------------------------------------  IN: 3080 mL / OUT: 4825 mL / NET: -1745 mL        PHYSICAL EXAM:  General: sleeping, awakes to voice, speaking in full sentences, no labored breathing on RA  HEENT: AT/NC, no facial asymmetry, NGT in place   Lungs: no crackles, no wheezes  Heart: regular  Abdomen: limited exam, no tenderness, mild distension, unable to assess BS  Extremities: warm, no edema, no calf tenderness, no tenderness, no focal deficit       LABS:                        14.9   6.46  )-----------( 223      ( 29 May 2025 05:30 )             45.2     05-29    136  |  104  |  11  ----------------------------<  77  4.4   |  21[L]  |  1.05    Ca    8.8      29 May 2025 05:30  Phos  2.5     05-29  Mg     2.1     05-29    TPro  7.4  /  Alb  3.8  /  TBili  0.4  /  DBili  x   /  AST  16  /  ALT  21  /  AlkPhos  72  05-27    LIVER FUNCTIONS - ( 27 May 2025 13:25 )  Alb: 3.8 g/dL / Pro: 7.4 g/dL / ALK PHOS: 72 U/L / ALT: 21 U/L / AST: 16 U/L / GGT: x           PT/INR - ( 27 May 2025 18:15 )   PT: 11.7 sec;   INR: 1.02          PTT - ( 27 May 2025 18:15 )  PTT:27.9 sec  CAPILLARY BLOOD GLUCOSE        Urinalysis Basic - ( 29 May 2025 05:30 )    Color: x / Appearance: x / SG: x / pH: x  Gluc: 77 mg/dL / Ketone: x  / Bili: x / Urobili: x   Blood: x / Protein: x / Nitrite: x   Leuk Esterase: x / RBC: x / WBC x   Sq Epi: x / Non Sq Epi: x / Bacteria: x        MICRODATA:      RADIOLOGY/OTHER STUDIES:

## 2025-05-29 NOTE — PROGRESS NOTE ADULT - ASSESSMENT
INTERVAL HPI/OVERNIGHT EVENTS: 2AM c/o chest pain 7/10. HDS. EKG normal sinus without ST changes. Got dilaudid, tylenol. -BF. C/o coughing up clear mucus, gave cepacol lozenge.     STATUS POST:  5/27: exlap, alec patch repair of anterior gastric perf, abdominal washout, EGD, IVF 1.7L, 300 UOP, Cefotetan     POST OPERATIVE DAY #: 2    SUBJECTIVE: Pt still reports chest/abd pain that has mildly improved. Reports mild nausea that has gotten better overnight. No bowel function.    MEDICATIONS  (STANDING):  fluconAZOLE IVPB 400 milliGRAM(s) IV Intermittent every 24 hours  heparin   Injectable 5000 Unit(s) SubCutaneous every 8 hours  lactated ringers. 1000 milliLiter(s) (140 mL/Hr) IV Continuous <Continuous>  nicotine -   7 mG/24Hr(s) Patch 1 Patch Transdermal daily  pantoprazole  Injectable 40 milliGRAM(s) IV Push daily  piperacillin/tazobactam IVPB.. 3.375 Gram(s) IV Intermittent every 8 hours    MEDICATIONS  (PRN):  acetaminophen   IVPB .. 1000 milliGRAM(s) IV Intermittent once PRN Temp greater or equal to 38C (100.4F), Mild Pain (1 - 3), Moderate Pain (4 - 6)  benzocaine/menthol Lozenge 1 Lozenge Oral every 4 hours PRN Sore Throat  HYDROmorphone  Injectable 0.5 milliGRAM(s) IV Push every 4 hours PRN Severe Pain (7 - 10)  HYDROmorphone  Injectable 0.25 milliGRAM(s) IV Push every 4 hours PRN Moderate Pain (4 - 6)  ondansetron Injectable 4 milliGRAM(s) IV Push every 6 hours PRN Nausea and/or Vomiting      Vital Signs Last 24 Hrs  T(C): 37.1 (29 May 2025 05:23), Max: 37.5 (29 May 2025 00:13)  T(F): 98.7 (29 May 2025 05:23), Max: 99.5 (29 May 2025 00:13)  HR: 73 (29 May 2025 06:28) (68 - 89)  BP: 147/86 (29 May 2025 06:28) (128/72 - 147/86)  BP(mean): --  RR: 18 (29 May 2025 05:23) (16 - 18)  SpO2: 97% (29 May 2025 05:23) (94% - 97%)    Parameters below as of 29 May 2025 05:23  Patient On (Oxygen Delivery Method): room air        PHYSICAL EXAM:      Constitutional: A&Ox3, nad  Head: NGT in place with bilious outpt  Respiratory: non labored breathing, no respiratory distress  Cardiovascular: NSR, RRR  Gastrointestinal: abd soft, mildly TTP in all 4 quads, mildly distended                 Incision: C/D/I, island dressing over midline incision  Genitourinary: voiding  Extremities: (-) edema, wwp, SCDs in place                  I&O's Detail    27 May 2025 07:01  -  28 May 2025 07:00  --------------------------------------------------------  IN:    Lactated Ringers: 225 mL    Lactated Ringers: 980 mL  Total IN: 1205 mL    OUT:    Indwelling Catheter - Urethral (mL): 750 mL    Nasogastric/Oral tube (mL): 100 mL  Total OUT: 850 mL    Total NET: 355 mL      28 May 2025 07:01  -  29 May 2025 06:59  --------------------------------------------------------  IN:    Lactated Ringers: 2800 mL  Total IN: 2800 mL    OUT:    Indwelling Catheter - Urethral (mL): 100 mL    Nasogastric/Oral tube (mL): 200 mL    Voided (mL): 4525 mL  Total OUT: 4825 mL    Total NET: -2025 mL          LABS:                        14.1   10.48 )-----------( 263      ( 28 May 2025 07:19 )             41.5     05-28    137  |  104  |  9   ----------------------------<  124[H]  4.6   |  22  |  0.96    Ca    8.8      28 May 2025 07:19  Phos  3.9     05-28  Mg     1.7     05-28    TPro  7.4  /  Alb  3.8  /  TBili  0.4  /  DBili  x   /  AST  16  /  ALT  21  /  AlkPhos  72  05-27    PT/INR - ( 27 May 2025 18:15 )   PT: 11.7 sec;   INR: 1.02          PTT - ( 27 May 2025 18:15 )  PTT:27.9 sec  Urinalysis Basic - ( 28 May 2025 07:19 )    Color: x / Appearance: x / SG: x / pH: x  Gluc: 124 mg/dL / Ketone: x  / Bili: x / Urobili: x   Blood: x / Protein: x / Nitrite: x   Leuk Esterase: x / RBC: x / WBC x   Sq Epi: x / Non Sq Epi: x / Bacteria: x        RADIOLOGY & ADDITIONAL STUDIES:

## 2025-05-29 NOTE — PROGRESS NOTE ADULT - NUTRITIONAL ASSESSMENT
29M PSHx appendectomy, admitted for findings of scattered foci of free air concerning for possible bowel perforation, physical exam remarkable for significant tenderness to palpation diffusely. Taken to OR urgently. Now s/p exlap, alec patch repair of anterior gastric perf, abdominal washout, EGD (5/27).     NPO/IVF  NGT to LIWS  Fluc/Zosyn  Pain/nausea PRN  SCDs/SQH/OOBA  AM labs

## 2025-05-30 LAB
ANION GAP SERPL CALC-SCNC: 9 MMOL/L — SIGNIFICANT CHANGE UP (ref 5–17)
BUN SERPL-MCNC: 11 MG/DL — SIGNIFICANT CHANGE UP (ref 7–23)
CALCIUM SERPL-MCNC: 8.6 MG/DL — SIGNIFICANT CHANGE UP (ref 8.4–10.5)
CHLORIDE SERPL-SCNC: 104 MMOL/L — SIGNIFICANT CHANGE UP (ref 96–108)
CO2 SERPL-SCNC: 24 MMOL/L — SIGNIFICANT CHANGE UP (ref 22–31)
CREAT SERPL-MCNC: 0.97 MG/DL — SIGNIFICANT CHANGE UP (ref 0.5–1.3)
EGFR: 108 ML/MIN/1.73M2 — SIGNIFICANT CHANGE UP
EGFR: 108 ML/MIN/1.73M2 — SIGNIFICANT CHANGE UP
GLUCOSE SERPL-MCNC: 83 MG/DL — SIGNIFICANT CHANGE UP (ref 70–99)
HCT VFR BLD CALC: 42.4 % — SIGNIFICANT CHANGE UP (ref 39–50)
HGB BLD-MCNC: 14.1 G/DL — SIGNIFICANT CHANGE UP (ref 13–17)
MAGNESIUM SERPL-MCNC: 1.7 MG/DL — SIGNIFICANT CHANGE UP (ref 1.6–2.6)
MCHC RBC-ENTMCNC: 25.4 PG — LOW (ref 27–34)
MCHC RBC-ENTMCNC: 33.3 G/DL — SIGNIFICANT CHANGE UP (ref 32–36)
MCV RBC AUTO: 76.4 FL — LOW (ref 80–100)
NRBC BLD AUTO-RTO: 0 /100 WBCS — SIGNIFICANT CHANGE UP (ref 0–0)
PHOSPHATE SERPL-MCNC: 3.4 MG/DL — SIGNIFICANT CHANGE UP (ref 2.5–4.5)
PLATELET # BLD AUTO: 213 K/UL — SIGNIFICANT CHANGE UP (ref 150–400)
POTASSIUM SERPL-MCNC: 4 MMOL/L — SIGNIFICANT CHANGE UP (ref 3.5–5.3)
POTASSIUM SERPL-SCNC: 4 MMOL/L — SIGNIFICANT CHANGE UP (ref 3.5–5.3)
RBC # BLD: 5.55 M/UL — SIGNIFICANT CHANGE UP (ref 4.2–5.8)
RBC # FLD: 13.7 % — SIGNIFICANT CHANGE UP (ref 10.3–14.5)
SODIUM SERPL-SCNC: 137 MMOL/L — SIGNIFICANT CHANGE UP (ref 135–145)
WBC # BLD: 5.45 K/UL — SIGNIFICANT CHANGE UP (ref 3.8–10.5)
WBC # FLD AUTO: 5.45 K/UL — SIGNIFICANT CHANGE UP (ref 3.8–10.5)

## 2025-05-30 PROCEDURE — 99233 SBSQ HOSP IP/OBS HIGH 50: CPT

## 2025-05-30 PROCEDURE — 74018 RADEX ABDOMEN 1 VIEW: CPT | Mod: 26,59

## 2025-05-30 PROCEDURE — 93010 ELECTROCARDIOGRAM REPORT: CPT

## 2025-05-30 PROCEDURE — 74240 X-RAY XM UPR GI TRC 1CNTRST: CPT | Mod: 26

## 2025-05-30 RX ORDER — MAGNESIUM SULFATE 500 MG/ML
1 SYRINGE (ML) INJECTION ONCE
Refills: 0 | Status: COMPLETED | OUTPATIENT
Start: 2025-05-30 | End: 2025-05-30

## 2025-05-30 RX ADMIN — Medication 25 GRAM(S): at 16:09

## 2025-05-30 RX ADMIN — Medication 0.5 MILLIGRAM(S): at 07:05

## 2025-05-30 RX ADMIN — Medication 0.5 MILLIGRAM(S): at 07:20

## 2025-05-30 RX ADMIN — Medication 100 MILLIGRAM(S): at 22:10

## 2025-05-30 RX ADMIN — Medication 0.5 MILLIGRAM(S): at 22:10

## 2025-05-30 RX ADMIN — NICOTINE POLACRILEX 1 PATCH: 4 GUM, CHEWING ORAL at 19:16

## 2025-05-30 RX ADMIN — Medication 0.5 MILLIGRAM(S): at 16:10

## 2025-05-30 RX ADMIN — Medication 0.5 MILLIGRAM(S): at 12:00

## 2025-05-30 RX ADMIN — Medication 1 LOZENGE: at 17:33

## 2025-05-30 RX ADMIN — Medication 0.5 MILLIGRAM(S): at 16:30

## 2025-05-30 RX ADMIN — Medication 25 GRAM(S): at 07:02

## 2025-05-30 RX ADMIN — SODIUM CHLORIDE 140 MILLILITER(S): 9 INJECTION, SOLUTION INTRAVENOUS at 06:23

## 2025-05-30 RX ADMIN — Medication 1 LOZENGE: at 22:50

## 2025-05-30 RX ADMIN — Medication 0.5 MILLIGRAM(S): at 03:10

## 2025-05-30 RX ADMIN — Medication 1 LOZENGE: at 10:59

## 2025-05-30 RX ADMIN — Medication 0.5 MILLIGRAM(S): at 02:53

## 2025-05-30 RX ADMIN — Medication 100 GRAM(S): at 09:38

## 2025-05-30 RX ADMIN — Medication 40 MILLIGRAM(S): at 12:56

## 2025-05-30 RX ADMIN — Medication 1 LOZENGE: at 06:23

## 2025-05-30 RX ADMIN — HEPARIN SODIUM 5000 UNIT(S): 1000 INJECTION INTRAVENOUS; SUBCUTANEOUS at 22:13

## 2025-05-30 RX ADMIN — NICOTINE POLACRILEX 1 PATCH: 4 GUM, CHEWING ORAL at 16:09

## 2025-05-30 RX ADMIN — HEPARIN SODIUM 5000 UNIT(S): 1000 INJECTION INTRAVENOUS; SUBCUTANEOUS at 16:09

## 2025-05-30 RX ADMIN — Medication 0.5 MILLIGRAM(S): at 11:42

## 2025-05-30 RX ADMIN — HEPARIN SODIUM 5000 UNIT(S): 1000 INJECTION INTRAVENOUS; SUBCUTANEOUS at 05:16

## 2025-05-30 RX ADMIN — NICOTINE POLACRILEX 1 PATCH: 4 GUM, CHEWING ORAL at 06:01

## 2025-05-30 RX ADMIN — NICOTINE POLACRILEX 1 PATCH: 4 GUM, CHEWING ORAL at 11:52

## 2025-05-30 RX ADMIN — Medication 0.5 MILLIGRAM(S): at 22:25

## 2025-05-30 NOTE — PROGRESS NOTE ADULT - SUBJECTIVE AND OBJECTIVE BOX
POST-OP DAY: #3 s/p exlap, alec patch repair of anterior gastric perf, abdominal washout, EGD      SUBJECTIVE: Patient seen and examined bedside by chief resident on AM rounds. Pt resting comfortably and in no acute distress. Pt admits to mild discomfort surrounding NGT. Otherwise, denies any nausea/vomiting around the tube. Pt was ambulatory yesterday. -BF     fluconAZOLE IVPB 400 milliGRAM(s) IV Intermittent every 24 hours  heparin   Injectable 5000 Unit(s) SubCutaneous every 8 hours  piperacillin/tazobactam IVPB.. 3.375 Gram(s) IV Intermittent every 8 hours    MEDICATIONS  (PRN):  acetaminophen   IVPB .. 1000 milliGRAM(s) IV Intermittent once PRN Temp greater or equal to 38C (100.4F), Mild Pain (1 - 3), Moderate Pain (4 - 6)  benzocaine/menthol Lozenge 1 Lozenge Oral every 4 hours PRN Sore Throat  HYDROmorphone  Injectable 0.5 milliGRAM(s) IV Push every 4 hours PRN Severe Pain (7 - 10)  HYDROmorphone  Injectable 0.25 milliGRAM(s) IV Push every 4 hours PRN Moderate Pain (4 - 6)  ondansetron Injectable 4 milliGRAM(s) IV Push every 6 hours PRN Nausea and/or Vomiting      I&O's Detail    29 May 2025 07:01  -  30 May 2025 07:00  --------------------------------------------------------  IN:    IV PiggyBack: 280 mL    IV PiggyBack: 100 mL    Lactated Ringers: 2380 mL  Total IN: 2760 mL    OUT:    Indwelling Catheter - Urethral (mL): 100 mL    Nasogastric/Oral tube (mL): 800 mL    Voided (mL): 1550 mL  Total OUT: 2450 mL    Total NET: 310 mL          Vital Signs Last 24 Hrs  T(C): 37.2 (30 May 2025 06:18), Max: 37.7 (29 May 2025 17:09)  T(F): 98.9 (30 May 2025 06:18), Max: 99.9 (29 May 2025 17:09)  HR: 70 (30 May 2025 06:18) (64 - 92)  BP: 150/80 (30 May 2025 06:18) (125/62 - 151/78)  BP(mean): 125 (30 May 2025 00:15) (102 - 125)  RR: 18 (30 May 2025 06:18) (16 - 18)  SpO2: 96% (30 May 2025 06:18) (96% - 99%)    Parameters below as of 30 May 2025 06:18  Patient On (Oxygen Delivery Method): room air        General: NAD, resting comfortably in bed  HEENT: NGT with bilious output   Pulm: Nonlabored breathing, no respiratory distress  Abd: soft, NT/ND. incision c/d/i with overlying staples   Extrem: WWP, no edema, SCDs in place    LABS:                        14.9   6.46  )-----------( 223      ( 29 May 2025 05:30 )             45.2     05-29    136  |  104  |  11  ----------------------------<  77  4.4   |  21[L]  |  1.05    Ca    8.8      29 May 2025 05:30  Phos  2.5     05-29  Mg     2.1     05-29        Urinalysis Basic - ( 29 May 2025 05:30 )    Color: x / Appearance: x / SG: x / pH: x  Gluc: 77 mg/dL / Ketone: x  / Bili: x / Urobili: x   Blood: x / Protein: x / Nitrite: x   Leuk Esterase: x / RBC: x / WBC x   Sq Epi: x / Non Sq Epi: x / Bacteria: x        RADIOLOGY & ADDITIONAL STUDIES:  CT Abdomen and Pelvis w/ IV Cont:   ACC: [id number] EXAM:  CT ABDOMEN AND PELVIS IC   ORDERED BY: DARYL ZAMORA     PROCEDURE DATE:  05/27/2025          INTERPRETATION:  CLINICAL INFORMATION: Upper abdominal pain    COMPARISON: None.    CONTRAST/COMPLICATIONS:  IV Contrast: Omnipaque 350  96 cc administered   4 cc discarded  Oral Contrast: NONE  .    PROCEDURE:  CT of the Abdomen and Pelvis was performed.  Sagittal and coronal reformats were performed.    FINDINGS:  LOWER CHEST: Within normal limits.    LIVER: Subcentimeter hypodensity seen peripherally in the right hepatic   lobe, benign-appearing  BILE DUCTS: Normal caliber.  GALLBLADDER: Within normal limits.  SPLEEN: Within normal limits.  PANCREAS: Within normal limits.  ADRENALS: Within normal limits.  KIDNEYS/URETERS:Within normal limits.    BLADDER: Within normal limits.  REPRODUCTIVE ORGANS: Within normal limits    BOWEL: No bowel obstruction. Appendix is not visualized. Limited   evaluation of the bowel due to the lack of enteric contrast.  PERITONEUM/RETROPERITONEUM: Scattered dependent and nondependent foci of   free air are noted, particularly along the midline anterior peritoneum   (2:46). Mild perihepatic fluid and mild fluid is noted in the left   paracolic gutter. No loculated collections to suggestan abscess. These   findings are strongly suggestive of bowel perforation.  VESSELS: Within normal limits.  LYMPH NODES: No lymphadenopathy.  ABDOMINAL WALL: Within normal limits.  BONES: Within normal limits.    IMPRESSION:  Scattered dependent and nondependent foci of free air with mild   perihepatic free fluid and fluid in the left paracolic gutter. The source   is unknown in this limited evaluation without enteric contrast and due to   the patient's thin body habitus.    The above findings were conveyed to Dr. Daryl Zamora in the emergency   department at [facility], at the time of this   dictation.        --- End of Report ---            SADIQ CONTEH MD; Attending Radiologist  This document has been electronically signed. May 27 2025  3:48PM (05-27-25 @ 15:26)

## 2025-05-30 NOTE — PATIENT PROFILE ADULT - FALL HARM RISK - HARM RISK INTERVENTIONS
Assistance with ambulation/Assistance OOB with selected safe patient handling equipment/Communicate Risk of Fall with Harm to all staff/Discuss with provider need for PT consult/Monitor gait and stability/Provide patient with walking aids - walker, cane, crutches/Reinforce activity limits and safety measures with patient and family/Sit up slowly, dangle for a short time, stand at bedside before walking/Tailored Fall Risk Interventions/Use of alarms - bed, chair and/or voice tab/Visual Cue: Yellow wristband and red socks/Bed in lowest position, wheels locked, appropriate side rails in place/Call bell, personal items and telephone in reach/Instruct patient to call for assistance before getting out of bed or chair/Non-slip footwear when patient is out of bed/[place] to call system/Physically safe environment - no spills, clutter or unnecessary equipment/Purposeful Proactive Rounding/Room/bathroom lighting operational, light cord in reach

## 2025-05-30 NOTE — PROGRESS NOTE ADULT - SUBJECTIVE AND OBJECTIVE BOX
SUBJECTIVE:  Patient was seen and examined at bedside. Patient reports feeling improving, had 2 flatus, no BM, NGT still place. Reports sternal chest pain, reproducible on palpation, no pleuritic chest pain, no SOB. No other complaints or events reported.    Review of systems: No fever, chills, dizziness, HA, Changes in vision, CP, dyspnea, nausea or vomiting, dysuria, LE edema. Rest of 12 point Review of systems negative unless otherwise documented elsewhere in note.     Diet, NPO (05-27-25 @ 18:33) [Active]      MEDICATIONS:  MEDICATIONS  (STANDING):  fluconAZOLE IVPB 400 milliGRAM(s) IV Intermittent every 24 hours  heparin   Injectable 5000 Unit(s) SubCutaneous every 8 hours  lactated ringers. 1000 milliLiter(s) (140 mL/Hr) IV Continuous <Continuous>  nicotine -   7 mG/24Hr(s) Patch 1 Patch Transdermal daily  pantoprazole  Injectable 40 milliGRAM(s) IV Push daily  piperacillin/tazobactam IVPB.. 3.375 Gram(s) IV Intermittent every 8 hours    MEDICATIONS  (PRN):  acetaminophen   IVPB .. 1000 milliGRAM(s) IV Intermittent once PRN Temp greater or equal to 38C (100.4F), Mild Pain (1 - 3), Moderate Pain (4 - 6)  benzocaine/menthol Lozenge 1 Lozenge Oral every 4 hours PRN Sore Throat  HYDROmorphone  Injectable 0.5 milliGRAM(s) IV Push every 4 hours PRN Severe Pain (7 - 10)  HYDROmorphone  Injectable 0.25 milliGRAM(s) IV Push every 4 hours PRN Moderate Pain (4 - 6)  ondansetron Injectable 4 milliGRAM(s) IV Push every 6 hours PRN Nausea and/or Vomiting      Allergies    peanuts (Anaphylaxis)  No Known Drug Allergies  Nuts (Hives)    Intolerances        OBJECTIVE:  Vital Signs Last 24 Hrs  T(C): 37.2 (30 May 2025 06:18), Max: 37.7 (29 May 2025 17:09)  T(F): 98.9 (30 May 2025 06:18), Max: 99.9 (29 May 2025 17:09)  HR: 62 (30 May 2025 08:10) (62 - 92)  BP: 145/77 (30 May 2025 08:10) (125/62 - 150/80)  BP(mean): 125 (30 May 2025 00:15) (102 - 125)  RR: 16 (30 May 2025 08:10) (16 - 18)  SpO2: 97% (30 May 2025 08:10) (96% - 99%)    Parameters below as of 30 May 2025 08:10  Patient On (Oxygen Delivery Method): room air      I&O's Summary    29 May 2025 07:01  -  30 May 2025 07:00  --------------------------------------------------------  IN: 2925 mL / OUT: 2650 mL / NET: 275 mL    30 May 2025 07:01  -  30 May 2025 10:07  --------------------------------------------------------  IN: 165 mL / OUT: 0 mL / NET: 165 mL        PHYSICAL EXAM:  General: AOX3, NAD, lying in bed, speaking in full sentences, no labored breathing on RA  HEENT: AT/NC, no facial asymmetry, NGT in place   Lungs: no crackles, no wheezes, some sternal discomfort on ambulation   Heart: regular  Abdomen: limited exam, no tenderness, mild distension, unable to assess BS  Extremities: warm, no edema, no calf tenderness, no tenderness, no focal deficit     LABS:                        14.1   5.45  )-----------( 213      ( 30 May 2025 05:30 )             42.4     05-30    137  |  104  |  11  ----------------------------<  83  4.0   |  24  |  0.97    Ca    8.6      30 May 2025 05:30  Phos  3.4     05-30  Mg     1.7     05-30          CAPILLARY BLOOD GLUCOSE        Urinalysis Basic - ( 30 May 2025 05:30 )    Color: x / Appearance: x / SG: x / pH: x  Gluc: 83 mg/dL / Ketone: x  / Bili: x / Urobili: x   Blood: x / Protein: x / Nitrite: x   Leuk Esterase: x / RBC: x / WBC x   Sq Epi: x / Non Sq Epi: x / Bacteria: x        MICRODATA:      RADIOLOGY/OTHER STUDIES:

## 2025-05-30 NOTE — PHYSICAL THERAPY INITIAL EVALUATION ADULT - ADDITIONAL COMMENTS
Pt reports to live in a shelter. Pt reports to be indpt with all ADLs and IADLs. Pt reports to ambulated without AD prior.

## 2025-05-30 NOTE — DIETITIAN INITIAL EVALUATION ADULT - PERTINENT LABORATORY DATA
05-30    137  |  104  |  11  ----------------------------<  83  4.0   |  24  |  0.97    Ca    8.6      30 May 2025 05:30  Phos  3.4     05-30  Mg     1.7     05-30    POCT Blood Glucose.: 75 mg/dL (05-30-25 @ 12:01)

## 2025-05-30 NOTE — DIETITIAN INITIAL EVALUATION ADULT - PERSON TAUGHT/METHOD
discussed typical diet progression pending MD orders, pt receptive./verbal instruction/patient instructed

## 2025-05-30 NOTE — PROGRESS NOTE ADULT - ASSESSMENT
29M PSHx appendectomy, admitted for findings of scattered foci of free air concerning for possible bowel perforation, physical exam remarkable for significant tenderness to palpation diffusely. Taken to OR urgently. Now s/p exlap, alec patch repair of anterior gastric perf, abdominal washout, EGD (5/27).     UGI series today   NPO/IVF  NGT to LIWS  Fluc/Zosyn  Pain/nausea PRN  SCDs/SQH/OOBA  AM labs 29M PSHx appendectomy, admitted for findings of scattered foci of free air concerning for possible bowel perforation, physical exam remarkable for significant tenderness to palpation diffusely. Taken to OR urgently. Now s/p exlap, alec patch repair of anterior gastric perf, abdominal washout, EGD (5/27).     Pending UGI when has bowel function   NPO/IVF  NGT to LIWS  Fluc/Zosyn  Pain/nausea PRN  SCDs/SQH/OOBA  AM labs

## 2025-05-30 NOTE — DIETITIAN INITIAL EVALUATION ADULT - ADD RECOMMEND
1. NPO - advance diet as tolerated to clear liquids to full liquids to low-fiber diet as medically feasible per MD orders   ** If unable to advance diet / pt unable to tolerate PO intake, consider initiating nutrition support, please reconsult RD for nutrition support recommendations PRN   2. Monitor wt trends, GI function, skin integrity  3. Monitor lytes, renal indices, blood glucose, LFTs    4. Pain and bowel regimen per team   RD will continue to monitor PO intake, labs, hydration, and wt prn.

## 2025-05-30 NOTE — PROGRESS NOTE ADULT - ASSESSMENT
28 y/o M with PMH of substance use admitted for gastric perforation s/p ex lap, alec patch repair of anterior gastric perf, abdominal washout, EGD    Post op state  Patient reports pain controlled, some flatus, no BM, NGT in place.   Currently on Pip/Tazo 3.375 and Fluconazole. Monitor QTc while on Fluconazole  Pain management per primary team, gentle IVF, monitor electrolytes   Patient with sternal pain reproducible on palpation, no hypoxia, no SOB, EKG reviewed, no ischemic changes, continue to monitor     Substance use disorder  Patient reports smoking crack cocaine, denies Opioid use. Utox positive for Fentanyl. Currently without signs of withdrawal. COWS q6h  Please notify medicine if patient with withdrawal symptoms      Possible HIV exposure  Patient reports needle tick injury in February, was started on prophylaxis  HIV testing negative     DVT ppx: SQH  Discussed with primary team

## 2025-05-30 NOTE — DIETITIAN INITIAL EVALUATION ADULT - OTHER INFO
"29M PSHx appendectomy, admitted for findings of scattered foci of free air concerning for possible bowel perforation, physical exam remarkable for significant tenderness to palpation diffusely. Taken to OR urgently. Now s/p exlap, alec patch repair of anterior gastric perf, abdominal washout, EGD (5/27)."    Patient seen at bedside on 9wo for nutrition assessment. Reports decreased appetite / PO intake x past month, endorses still consuming ~3 meals/day but consuming less of each meal, still possibly meeting >/= 75% estimated nutrient needs PTA. Current diet order: remains NPO status post OR yesterday, observed with NGT in place to LWS. Dosing weight: 215 pounds, reports UBW of ~225 pounds x1 month ago. Unintentional wt loss of 10 pounds / 4.4% x1 month, not clinically significant. No pressure injuries documented. No edema documented. Denies nausea/vomiting, reports no bowel movement since surgery, last bowel movement documented 5/27. 1000mL output from NGT (5/29-5/30) per flowsheet. Labs: nutritionally pertinent labs WNL. Meds: LR, protonix, antibiotics, zofran PRN. Observed with no overt signs and symptoms of muscle or fat wasting - does not meet criteria for protein-calorie malnutrition as per ASPEN guidelines at this time but likely at high risk if to remain NPO. No cultural, ethnic, Jewish food preferences reported. Noted pt presents from shelter per H&P, food resources not provided at this time due to pt's clinical status, will provide PRN upon follow up. See nutrition recommendations below.  "29M PSHx appendectomy, admitted for findings of scattered foci of free air concerning for possible bowel perforation, physical exam remarkable for significant tenderness to palpation diffusely. Taken to OR urgently. Now s/p exlap, alec patch repair of anterior gastric perf, abdominal washout, EGD (5/27)."    Patient seen at bedside on 9wo for nutrition assessment. Reports decreased appetite / PO intake x past month, endorses still consuming ~3 meals/day but consuming less of each meal, still possibly meeting >/= 75% estimated nutrient needs PTA. Current diet order: remains NPO status post OR yesterday, observed with NGT in place to LWS. Confirmed food allergies to nuts / peanuts - in EMR / CBORD. Dosing weight: 215 pounds, reports UBW of ~225 pounds x1 month ago. Unintentional wt loss of 10 pounds / 4.4% x1 month, not clinically significant. No pressure injuries documented. No edema documented. Denies nausea/vomiting, reports no bowel movement since surgery, last bowel movement documented 5/27. 1000mL output from NGT (5/29-5/30) per flowsheet. Labs: nutritionally pertinent labs WNL. Meds: LR, protonix, antibiotics, zofran PRN. Observed with no overt signs and symptoms of muscle or fat wasting - does not meet criteria for protein-calorie malnutrition as per ASPEN guidelines at this time but likely at high risk if to remain NPO. No cultural, ethnic, Mu-ism food preferences reported. Noted pt presents from shelter per H&P, food resources not provided at this time due to pt's clinical status, will provide PRN upon follow up. See nutrition recommendations below.

## 2025-05-30 NOTE — PROGRESS NOTE ADULT - TIME BILLING
Bedside exam and interview   Reviewed vitals, labs, telemetry   Discussed patient's plan of care with primary team   Documentation of encounter  Time spent on the encounter excludes teaching and separately reported services
Bedside exam and interview   Reviewed vitals, labs, imaging    Discussed patient's plan of care with primary team   Documentation of encounter.  Time spent on the encounter excludes teaching and separately reported services

## 2025-05-30 NOTE — DIETITIAN INITIAL EVALUATION ADULT - PERTINENT MEDS FT
MEDICATIONS  (STANDING):  fluconAZOLE IVPB 400 milliGRAM(s) IV Intermittent every 24 hours  heparin   Injectable 5000 Unit(s) SubCutaneous every 8 hours  lactated ringers. 1000 milliLiter(s) (140 mL/Hr) IV Continuous <Continuous>  nicotine -   7 mG/24Hr(s) Patch 1 Patch Transdermal daily  pantoprazole  Injectable 40 milliGRAM(s) IV Push daily  piperacillin/tazobactam IVPB.. 3.375 Gram(s) IV Intermittent every 8 hours    MEDICATIONS  (PRN):  acetaminophen   IVPB .. 1000 milliGRAM(s) IV Intermittent once PRN Temp greater or equal to 38C (100.4F), Mild Pain (1 - 3), Moderate Pain (4 - 6)  benzocaine/menthol Lozenge 1 Lozenge Oral every 4 hours PRN Sore Throat  HYDROmorphone  Injectable 0.5 milliGRAM(s) IV Push every 4 hours PRN Severe Pain (7 - 10)  HYDROmorphone  Injectable 0.25 milliGRAM(s) IV Push every 4 hours PRN Moderate Pain (4 - 6)  ondansetron Injectable 4 milliGRAM(s) IV Push every 6 hours PRN Nausea and/or Vomiting

## 2025-05-30 NOTE — PHYSICAL THERAPY INITIAL EVALUATION ADULT - PERTINENT HX OF CURRENT PROBLEM, REHAB EVAL
28yo Male pt with PMH PNA, PSHx appendectomy, presents from [facility] reporting sharp abd pain to epigastrium/LUQ which began today, w/ radiation to RUQ, pt denies any nausea, vomiting, fever, chills, diarrhea, CP, SOB. In the ED, pt afebrile, nontachycardic, normotensive, and satting on RA. On exam, abd diffusely tender to palpation, nondistended. Labs demonstrate WBC 10, hgb 15.3, LA 0.8, CMP WNL. CTAP showing scattered foci of free air w/ perihepatic free fluid in LT paracolic gutter, concerning for possible bowel perforation. Pt reports intermittent meth/cocaine use, last use x3 days ago, no reported sx of withdrawal

## 2025-05-30 NOTE — DIETITIAN INITIAL EVALUATION ADULT - NSFNSGIIOFT_GEN_A_CORE
05-29-25 @ 07:01  -  05-30-25 @ 07:00  --------------------------------------------------------  OUT:    Nasogastric/Oral tube (mL): 1000 mL  Total OUT: 1000 mL    Total NET: -1000 mL

## 2025-05-30 NOTE — PATIENT PROFILE ADULT - ARRIVAL FROM
came from [facility] then had ex lap, etc Attending Attestation (For Attendings USE Only)...

## 2025-05-31 LAB
ANION GAP SERPL CALC-SCNC: 12 MMOL/L — SIGNIFICANT CHANGE UP (ref 5–17)
BUN SERPL-MCNC: 13 MG/DL — SIGNIFICANT CHANGE UP (ref 7–23)
CALCIUM SERPL-MCNC: 9 MG/DL — SIGNIFICANT CHANGE UP (ref 8.4–10.5)
CHLORIDE SERPL-SCNC: 105 MMOL/L — SIGNIFICANT CHANGE UP (ref 96–108)
CO2 SERPL-SCNC: 21 MMOL/L — LOW (ref 22–31)
CREAT SERPL-MCNC: 1 MG/DL — SIGNIFICANT CHANGE UP (ref 0.5–1.3)
EGFR: 104 ML/MIN/1.73M2 — SIGNIFICANT CHANGE UP
EGFR: 104 ML/MIN/1.73M2 — SIGNIFICANT CHANGE UP
GLUCOSE SERPL-MCNC: 82 MG/DL — SIGNIFICANT CHANGE UP (ref 70–99)
HCT VFR BLD CALC: 41.4 % — SIGNIFICANT CHANGE UP (ref 39–50)
HGB BLD-MCNC: 14 G/DL — SIGNIFICANT CHANGE UP (ref 13–17)
MAGNESIUM SERPL-MCNC: 1.8 MG/DL — SIGNIFICANT CHANGE UP (ref 1.6–2.6)
MCHC RBC-ENTMCNC: 25.6 PG — LOW (ref 27–34)
MCHC RBC-ENTMCNC: 33.8 G/DL — SIGNIFICANT CHANGE UP (ref 32–36)
MCV RBC AUTO: 75.8 FL — LOW (ref 80–100)
NRBC BLD AUTO-RTO: 0 /100 WBCS — SIGNIFICANT CHANGE UP (ref 0–0)
PHOSPHATE SERPL-MCNC: 3.9 MG/DL — SIGNIFICANT CHANGE UP (ref 2.5–4.5)
PLATELET # BLD AUTO: 227 K/UL — SIGNIFICANT CHANGE UP (ref 150–400)
POTASSIUM SERPL-MCNC: 3.9 MMOL/L — SIGNIFICANT CHANGE UP (ref 3.5–5.3)
POTASSIUM SERPL-SCNC: 3.9 MMOL/L — SIGNIFICANT CHANGE UP (ref 3.5–5.3)
RBC # BLD: 5.46 M/UL — SIGNIFICANT CHANGE UP (ref 4.2–5.8)
RBC # FLD: 13.4 % — SIGNIFICANT CHANGE UP (ref 10.3–14.5)
SODIUM SERPL-SCNC: 138 MMOL/L — SIGNIFICANT CHANGE UP (ref 135–145)
WBC # BLD: 4.2 K/UL — SIGNIFICANT CHANGE UP (ref 3.8–10.5)
WBC # FLD AUTO: 4.2 K/UL — SIGNIFICANT CHANGE UP (ref 3.8–10.5)

## 2025-05-31 PROCEDURE — 99232 SBSQ HOSP IP/OBS MODERATE 35: CPT

## 2025-05-31 RX ORDER — MAGNESIUM SULFATE 500 MG/ML
2 SYRINGE (ML) INJECTION
Refills: 0 | Status: COMPLETED | OUTPATIENT
Start: 2025-05-31 | End: 2025-05-31

## 2025-05-31 RX ORDER — BENZOCAINE 220 MG/G
1 SPRAY, METERED PERIODONTAL ONCE
Refills: 0 | Status: COMPLETED | OUTPATIENT
Start: 2025-05-31 | End: 2025-05-31

## 2025-05-31 RX ADMIN — Medication 1 LOZENGE: at 11:22

## 2025-05-31 RX ADMIN — HEPARIN SODIUM 5000 UNIT(S): 1000 INJECTION INTRAVENOUS; SUBCUTANEOUS at 21:45

## 2025-05-31 RX ADMIN — NICOTINE POLACRILEX 1 PATCH: 4 GUM, CHEWING ORAL at 18:19

## 2025-05-31 RX ADMIN — Medication 0.5 MILLIGRAM(S): at 17:51

## 2025-05-31 RX ADMIN — Medication 25 GRAM(S): at 09:14

## 2025-05-31 RX ADMIN — Medication 0.5 MILLIGRAM(S): at 21:50

## 2025-05-31 RX ADMIN — NICOTINE POLACRILEX 1 PATCH: 4 GUM, CHEWING ORAL at 08:33

## 2025-05-31 RX ADMIN — Medication 1 LOZENGE: at 07:01

## 2025-05-31 RX ADMIN — Medication 1 LOZENGE: at 20:43

## 2025-05-31 RX ADMIN — Medication 0.5 MILLIGRAM(S): at 18:15

## 2025-05-31 RX ADMIN — Medication 40 MILLIGRAM(S): at 11:25

## 2025-05-31 RX ADMIN — Medication 0.5 MILLIGRAM(S): at 11:50

## 2025-05-31 RX ADMIN — Medication 0.5 MILLIGRAM(S): at 22:15

## 2025-05-31 RX ADMIN — Medication 25 GRAM(S): at 00:15

## 2025-05-31 RX ADMIN — Medication 25 GRAM(S): at 07:33

## 2025-05-31 RX ADMIN — Medication 25 GRAM(S): at 12:57

## 2025-05-31 RX ADMIN — Medication 1 LOZENGE: at 15:48

## 2025-05-31 RX ADMIN — Medication 100 MILLIGRAM(S): at 21:46

## 2025-05-31 RX ADMIN — Medication 0.5 MILLIGRAM(S): at 07:05

## 2025-05-31 RX ADMIN — SODIUM CHLORIDE 140 MILLILITER(S): 9 INJECTION, SOLUTION INTRAVENOUS at 00:15

## 2025-05-31 RX ADMIN — Medication 0.5 MILLIGRAM(S): at 07:20

## 2025-05-31 RX ADMIN — HEPARIN SODIUM 5000 UNIT(S): 1000 INJECTION INTRAVENOUS; SUBCUTANEOUS at 15:07

## 2025-05-31 RX ADMIN — NICOTINE POLACRILEX 1 PATCH: 4 GUM, CHEWING ORAL at 11:22

## 2025-05-31 RX ADMIN — NICOTINE POLACRILEX 1 PATCH: 4 GUM, CHEWING ORAL at 11:40

## 2025-05-31 RX ADMIN — Medication 25 GRAM(S): at 15:07

## 2025-05-31 RX ADMIN — Medication 0.5 MILLIGRAM(S): at 11:24

## 2025-05-31 RX ADMIN — HEPARIN SODIUM 5000 UNIT(S): 1000 INJECTION INTRAVENOUS; SUBCUTANEOUS at 06:58

## 2025-05-31 RX ADMIN — BENZOCAINE 1 SPRAY(S): 220 SPRAY, METERED PERIODONTAL at 17:51

## 2025-05-31 RX ADMIN — Medication 25 GRAM(S): at 23:39

## 2025-05-31 RX ADMIN — Medication 100 MILLIEQUIVALENT(S): at 10:45

## 2025-05-31 RX ADMIN — SODIUM CHLORIDE 140 MILLILITER(S): 9 INJECTION, SOLUTION INTRAVENOUS at 07:32

## 2025-05-31 RX ADMIN — SODIUM CHLORIDE 140 MILLILITER(S): 9 INJECTION, SOLUTION INTRAVENOUS at 15:48

## 2025-05-31 NOTE — PROGRESS NOTE ADULT - SUBJECTIVE AND OBJECTIVE BOX
INTERVAL EVENTS: no acute events or complaints    PAST MEDICAL & SURGICAL HISTORY:  No pertinent past medical history    Asthma    No significant past surgical history    No significant past surgical history    S/P appendectomy        MEDICATIONS  (STANDING):  fluconAZOLE IVPB 400 milliGRAM(s) IV Intermittent every 24 hours  heparin   Injectable 5000 Unit(s) SubCutaneous every 8 hours  lactated ringers. 1000 milliLiter(s) (140 mL/Hr) IV Continuous <Continuous>  magnesium sulfate  IVPB 2 Gram(s) IV Intermittent every 2 hours  nicotine -   7 mG/24Hr(s) Patch 1 Patch Transdermal daily  pantoprazole  Injectable 40 milliGRAM(s) IV Push daily  piperacillin/tazobactam IVPB.. 3.375 Gram(s) IV Intermittent every 8 hours    MEDICATIONS  (PRN):  acetaminophen   IVPB .. 1000 milliGRAM(s) IV Intermittent once PRN Temp greater or equal to 38C (100.4F), Mild Pain (1 - 3), Moderate Pain (4 - 6)  benzocaine/menthol Lozenge 1 Lozenge Oral every 4 hours PRN Sore Throat  HYDROmorphone  Injectable 0.5 milliGRAM(s) IV Push every 4 hours PRN Severe Pain (7 - 10)  HYDROmorphone  Injectable 0.25 milliGRAM(s) IV Push every 4 hours PRN Moderate Pain (4 - 6)  ondansetron Injectable 4 milliGRAM(s) IV Push every 6 hours PRN Nausea and/or Vomiting    T(F): 99 (05-31-25 @ 10:08), Max: 99 (05-31-25 @ 10:08)  HR: 60 (05-31-25 @ 11:24) (55 - 75)  BP: 134/76 (05-31-25 @ 11:24) (127/72 - 155/81)  BP(mean): 97 (05-31-25 @ 06:58) (97 - 111)  ABP: --  ABP(mean): --  RR: 18 (05-31-25 @ 11:24) (16 - 19)  SpO2: 97% (05-31-25 @ 11:24) (95% - 98%)  CVP(mm Hg): --    I/O Detail 24H    30 May 2025 07:01  -  31 May 2025 07:00  --------------------------------------------------------  IN:    IV PiggyBack: 200 mL    IV PiggyBack: 150 mL    Lactated Ringers: 3080 mL  Total IN: 3430 mL    OUT:    Nasogastric/Oral tube (mL): 1150 mL    Voided (mL): 950 mL  Total OUT: 2100 mL    Total NET: 1330 mL      31 May 2025 07:01  -  31 May 2025 12:37  --------------------------------------------------------  IN:    IV PiggyBack: 50 mL    IV PiggyBack: 25 mL    Lactated Ringers: 280 mL  Total IN: 355 mL    OUT:    Nasogastric/Oral tube (mL): 200 mL  Total OUT: 200 mL    Total NET: 155 mL          PHYSICAL EXAM:  General: AOX3, NAD, lying in bed, speaking in full sentences, no labored breathing on RA  HEENT: AT/NC, no facial asymmetry, NGT in place   Lungs: no crackles, no wheezes, some sternal discomfort on ambulation   Heart: regular  Abdomen: limited exam, no tenderness, mild distension, unable to assess BS  Extremities: warm, no edema, no calf tenderness, no tenderness, no focal deficit       LABS:  CBC 05-31-25 @ 05:30                        14.0   4.20  )-----------( 227                   41.4     Hgb trend: 14.0 <-- , 14.1 <-- , 14.9 <--   WBC trend: 4.20 <-- , 5.45 <-- , 6.46 <--       CMP 05-31-25 @ 05:30    138  |  105  |  13  ----------------------------<  82  3.9   |  21[L]  |  1.00    Ca    9.0      05-31-25 @ 05:30  Phos  3.9     05-31  Mg     1.8     05-31      Serum Cr (eGFR) trend: 1.00 (104) <-- , 0.97 (108) <-- , 1.05 (98) <--           Cardiac Markers         STUDIES:

## 2025-05-31 NOTE — PROGRESS NOTE ADULT - ASSESSMENT
28 y/o M with PMH of substance use admitted for gastric perforation s/p ex lap, alec patch repair of anterior gastric perf, abdominal washout, EGD    Post op state  Patient reports pain controlled, some flatus, no BM, NGT in place.   Currently on Pip/Tazo 3.375 and Fluconazole. Monitor QTc while on Fluconazole  Pain management per primary team, gentle IVF, monitor electrolytes   Patient with sternal pain reproducible on palpation, no hypoxia, no SOB, EKG reviewed, no ischemic changes, continue to monitor     Substance use disorder  Patient reports smoking crack cocaine, denies Opioid use. Utox positive for Fentanyl. Currently without signs of withdrawal. COWS q6h  Please notify medicine if patient with withdrawal symptoms      Possible HIV exposure  Patient reports needle tick injury in February, was started on prophylaxis  HIV testing negative     DVT ppx: SQH

## 2025-05-31 NOTE — PROGRESS NOTE ADULT - SUBJECTIVE AND OBJECTIVE BOX
INTERVAL HPI/OVERNIGHT EVENTS: PORFIRIO +fl -BM    STATUS POST:  exlap, alec patch repair of anterior gastric perf, abdominal washout, EGD     POST OPERATIVE DAY #: 4    SUBJECTIVE: Patient seen at the bedside by the team today. Patient lying in bed resting comfortably in NAD. Reports abdominal pain. -N/-V. +F/-BM. OOBA.       fluconAZOLE IVPB 400 milliGRAM(s) IV Intermittent every 24 hours  heparin   Injectable 5000 Unit(s) SubCutaneous every 8 hours  piperacillin/tazobactam IVPB.. 3.375 Gram(s) IV Intermittent every 8 hours      Vital Signs Last 24 Hrs  T(C): 36.3 (31 May 2025 05:13), Max: 37.2 (30 May 2025 16:10)  T(F): 97.4 (31 May 2025 05:13), Max: 98.9 (30 May 2025 16:10)  HR: 63 (31 May 2025 06:58) (58 - 75)  BP: 133/72 (31 May 2025 06:58) (127/72 - 155/81)  BP(mean): 97 (31 May 2025 06:58) (97 - 111)  RR: 19 (31 May 2025 06:58) (16 - 19)  SpO2: 98% (31 May 2025 06:58) (95% - 98%)    Parameters below as of 31 May 2025 06:58  Patient On (Oxygen Delivery Method): room air      I&O's Detail    30 May 2025 07:01  -  31 May 2025 07:00  --------------------------------------------------------  IN:    IV PiggyBack: 200 mL    IV PiggyBack: 150 mL    Lactated Ringers: 3080 mL  Total IN: 3430 mL    OUT:    Nasogastric/Oral tube (mL): 1150 mL    Voided (mL): 950 mL  Total OUT: 2100 mL    Total NET: 1330 mL      31 May 2025 07:01  -  31 May 2025 10:30  --------------------------------------------------------  IN:    IV PiggyBack: 50 mL    IV PiggyBack: 25 mL    Lactated Ringers: 280 mL  Total IN: 355 mL    OUT:    Nasogastric/Oral tube (mL): 200 mL  Total OUT: 200 mL    Total NET: 155 mL          General: NAD, resting comfortably in bed  C/V: NSR  Pulm: Nonlabored breathing, no respiratory distress  Abd: soft, mild TTP along midline, ND, no rebound or guarding, midline wound C/D?I  Extrem: WWP, no edema, SCDs in place  Drains:  [place]:      LABS:                        14.0   4.20  )-----------( 227      ( 31 May 2025 05:30 )             41.4     05-31    138  |  105  |  13  ----------------------------<  82  3.9   |  21[L]  |  1.00    Ca    9.0      31 May 2025 05:30  Phos  3.9     05-31  Mg     1.8     05-31        Urinalysis Basic - ( 31 May 2025 05:30 )    Color: x / Appearance: x / SG: x / pH: x  Gluc: 82 mg/dL / Ketone: x  / Bili: x / Urobili: x   Blood: x / Protein: x / Nitrite: x   Leuk Esterase: x / RBC: x / WBC x   Sq Epi: x / Non Sq Epi: x / Bacteria: x        RADIOLOGY & ADDITIONAL STUDIES:   INTERVAL HPI/OVERNIGHT EVENTS: PORFIRIO +fl -BM    STATUS POST:  exlap, alec patch repair of anterior gastric perf, abdominal washout, EGD     POST OPERATIVE DAY #: 4    SUBJECTIVE: Patient seen at the bedside by the team today. Patient lying in bed resting comfortably in NAD. Reports abdominal pain. -N/-V. +F/-BM. OOBA.       fluconAZOLE IVPB 400 milliGRAM(s) IV Intermittent every 24 hours  heparin   Injectable 5000 Unit(s) SubCutaneous every 8 hours  piperacillin/tazobactam IVPB.. 3.375 Gram(s) IV Intermittent every 8 hours      Vital Signs Last 24 Hrs  T(C): 36.3 (31 May 2025 05:13), Max: 37.2 (30 May 2025 16:10)  T(F): 97.4 (31 May 2025 05:13), Max: 98.9 (30 May 2025 16:10)  HR: 63 (31 May 2025 06:58) (58 - 75)  BP: 133/72 (31 May 2025 06:58) (127/72 - 155/81)  BP(mean): 97 (31 May 2025 06:58) (97 - 111)  RR: 19 (31 May 2025 06:58) (16 - 19)  SpO2: 98% (31 May 2025 06:58) (95% - 98%)    Parameters below as of 31 May 2025 06:58  Patient On (Oxygen Delivery Method): room air      I&O's Detail    30 May 2025 07:01  -  31 May 2025 07:00  --------------------------------------------------------  IN:    IV PiggyBack: 200 mL    IV PiggyBack: 150 mL    Lactated Ringers: 3080 mL  Total IN: 3430 mL    OUT:    Nasogastric/Oral tube (mL): 1150 mL    Voided (mL): 950 mL  Total OUT: 2100 mL    Total NET: 1330 mL      31 May 2025 07:01  -  31 May 2025 10:30  --------------------------------------------------------  IN:    IV PiggyBack: 50 mL    IV PiggyBack: 25 mL    Lactated Ringers: 280 mL  Total IN: 355 mL    OUT:    Nasogastric/Oral tube (mL): 200 mL  Total OUT: 200 mL    Total NET: 155 mL          General: NAD, resting comfortably in bed  C/V: NSR  Pulm: Nonlabored breathing, no respiratory distress  Abd: soft, mild TTP along midline, ND, no rebound or guarding, midline wound C/D/I  Extrem: WWP, no edema, SCDs in place  Drains:  [place]:      LABS:                        14.0   4.20  )-----------( 227      ( 31 May 2025 05:30 )             41.4     05-31    138  |  105  |  13  ----------------------------<  82  3.9   |  21[L]  |  1.00    Ca    9.0      31 May 2025 05:30  Phos  3.9     05-31  Mg     1.8     05-31        Urinalysis Basic - ( 31 May 2025 05:30 )    Color: x / Appearance: x / SG: x / pH: x  Gluc: 82 mg/dL / Ketone: x  / Bili: x / Urobili: x   Blood: x / Protein: x / Nitrite: x   Leuk Esterase: x / RBC: x / WBC x   Sq Epi: x / Non Sq Epi: x / Bacteria: x        RADIOLOGY & ADDITIONAL STUDIES:

## 2025-05-31 NOTE — PROVIDER CONTACT NOTE (OTHER) - SITUATION
s/p ex lap alec patch repair of anterior gatric perf. POD # 4 , woke up complaining of chest pain, and stated, chest pain goes away on side lying position.

## 2025-05-31 NOTE — PROGRESS NOTE ADULT - ASSESSMENT
29M PSHx appendectomy, admitted for findings of scattered foci of free air concerning for possible bowel perforation, physical exam remarkable for significant tenderness to palpation diffusely. Taken to OR urgently. Now s/p exlap, alec patch repair of anterior gastric perf, abdominal washout, EGD (5/27).     NPO/IVF  NGT to LIWS  Fluc/Zosyn (5/27-5/31)  Pain/nausea PRN  SCDs/SQH/OOBA  AM labs  Dispo: shelter; PT no needs

## 2025-06-01 LAB
ANION GAP SERPL CALC-SCNC: 11 MMOL/L — SIGNIFICANT CHANGE UP (ref 5–17)
BUN SERPL-MCNC: 14 MG/DL — SIGNIFICANT CHANGE UP (ref 7–23)
CALCIUM SERPL-MCNC: 8.6 MG/DL — SIGNIFICANT CHANGE UP (ref 8.4–10.5)
CHLORIDE SERPL-SCNC: 106 MMOL/L — SIGNIFICANT CHANGE UP (ref 96–108)
CO2 SERPL-SCNC: 23 MMOL/L — SIGNIFICANT CHANGE UP (ref 22–31)
CREAT SERPL-MCNC: 1.08 MG/DL — SIGNIFICANT CHANGE UP (ref 0.5–1.3)
EGFR: 95 ML/MIN/1.73M2 — SIGNIFICANT CHANGE UP
EGFR: 95 ML/MIN/1.73M2 — SIGNIFICANT CHANGE UP
GLUCOSE SERPL-MCNC: 80 MG/DL — SIGNIFICANT CHANGE UP (ref 70–99)
HCT VFR BLD CALC: 39.7 % — SIGNIFICANT CHANGE UP (ref 39–50)
HGB BLD-MCNC: 13.4 G/DL — SIGNIFICANT CHANGE UP (ref 13–17)
MAGNESIUM SERPL-MCNC: 1.8 MG/DL — SIGNIFICANT CHANGE UP (ref 1.6–2.6)
MCHC RBC-ENTMCNC: 25.8 PG — LOW (ref 27–34)
MCHC RBC-ENTMCNC: 33.8 G/DL — SIGNIFICANT CHANGE UP (ref 32–36)
MCV RBC AUTO: 76.3 FL — LOW (ref 80–100)
NRBC BLD AUTO-RTO: 0 /100 WBCS — SIGNIFICANT CHANGE UP (ref 0–0)
PHOSPHATE SERPL-MCNC: 3.6 MG/DL — SIGNIFICANT CHANGE UP (ref 2.5–4.5)
PLATELET # BLD AUTO: 234 K/UL — SIGNIFICANT CHANGE UP (ref 150–400)
POTASSIUM SERPL-MCNC: 3.9 MMOL/L — SIGNIFICANT CHANGE UP (ref 3.5–5.3)
POTASSIUM SERPL-SCNC: 3.9 MMOL/L — SIGNIFICANT CHANGE UP (ref 3.5–5.3)
RBC # BLD: 5.2 M/UL — SIGNIFICANT CHANGE UP (ref 4.2–5.8)
RBC # FLD: 13.2 % — SIGNIFICANT CHANGE UP (ref 10.3–14.5)
SODIUM SERPL-SCNC: 140 MMOL/L — SIGNIFICANT CHANGE UP (ref 135–145)
WBC # BLD: 4.23 K/UL — SIGNIFICANT CHANGE UP (ref 3.8–10.5)
WBC # FLD AUTO: 4.23 K/UL — SIGNIFICANT CHANGE UP (ref 3.8–10.5)

## 2025-06-01 PROCEDURE — 99232 SBSQ HOSP IP/OBS MODERATE 35: CPT

## 2025-06-01 RX ORDER — ACETAMINOPHEN 500 MG/5ML
1000 LIQUID (ML) ORAL EVERY 6 HOURS
Refills: 0 | Status: DISCONTINUED | OUTPATIENT
Start: 2025-06-01 | End: 2025-06-04

## 2025-06-01 RX ORDER — MELATONIN 5 MG
5 TABLET ORAL AT BEDTIME
Refills: 0 | Status: DISCONTINUED | OUTPATIENT
Start: 2025-06-01 | End: 2025-06-04

## 2025-06-01 RX ORDER — OXYCODONE HYDROCHLORIDE 30 MG/1
2.5 TABLET ORAL EVERY 6 HOURS
Refills: 0 | Status: DISCONTINUED | OUTPATIENT
Start: 2025-06-01 | End: 2025-06-03

## 2025-06-01 RX ORDER — OXYCODONE HYDROCHLORIDE 30 MG/1
5 TABLET ORAL EVERY 6 HOURS
Refills: 0 | Status: DISCONTINUED | OUTPATIENT
Start: 2025-06-01 | End: 2025-06-03

## 2025-06-01 RX ORDER — HYDROMORPHONE/SOD CHLOR,ISO/PF 2 MG/10 ML
0.2 SYRINGE (ML) INJECTION EVERY 6 HOURS
Refills: 0 | Status: DISCONTINUED | OUTPATIENT
Start: 2025-06-01 | End: 2025-06-03

## 2025-06-01 RX ORDER — MAGNESIUM SULFATE 500 MG/ML
1 SYRINGE (ML) INJECTION ONCE
Refills: 0 | Status: COMPLETED | OUTPATIENT
Start: 2025-06-01 | End: 2025-06-01

## 2025-06-01 RX ADMIN — Medication 1 LOZENGE: at 05:47

## 2025-06-01 RX ADMIN — OXYCODONE HYDROCHLORIDE 5 MILLIGRAM(S): 30 TABLET ORAL at 20:40

## 2025-06-01 RX ADMIN — NICOTINE POLACRILEX 1 PATCH: 4 GUM, CHEWING ORAL at 19:03

## 2025-06-01 RX ADMIN — Medication 0.5 MILLIGRAM(S): at 06:15

## 2025-06-01 RX ADMIN — Medication 0.5 MILLIGRAM(S): at 05:52

## 2025-06-01 RX ADMIN — Medication 5 MILLIGRAM(S): at 23:24

## 2025-06-01 RX ADMIN — NICOTINE POLACRILEX 1 PATCH: 4 GUM, CHEWING ORAL at 12:17

## 2025-06-01 RX ADMIN — OXYCODONE HYDROCHLORIDE 5 MILLIGRAM(S): 30 TABLET ORAL at 21:30

## 2025-06-01 RX ADMIN — Medication 100 MILLIGRAM(S): at 21:51

## 2025-06-01 RX ADMIN — Medication 25 GRAM(S): at 07:05

## 2025-06-01 RX ADMIN — NICOTINE POLACRILEX 1 PATCH: 4 GUM, CHEWING ORAL at 11:56

## 2025-06-01 RX ADMIN — HEPARIN SODIUM 5000 UNIT(S): 1000 INJECTION INTRAVENOUS; SUBCUTANEOUS at 05:47

## 2025-06-01 RX ADMIN — NICOTINE POLACRILEX 1 PATCH: 4 GUM, CHEWING ORAL at 06:45

## 2025-06-01 RX ADMIN — Medication 100 GRAM(S): at 09:31

## 2025-06-01 RX ADMIN — Medication 40 MILLIGRAM(S): at 12:17

## 2025-06-01 RX ADMIN — Medication 25 GRAM(S): at 15:00

## 2025-06-01 RX ADMIN — HEPARIN SODIUM 5000 UNIT(S): 1000 INJECTION INTRAVENOUS; SUBCUTANEOUS at 21:51

## 2025-06-01 RX ADMIN — HEPARIN SODIUM 5000 UNIT(S): 1000 INJECTION INTRAVENOUS; SUBCUTANEOUS at 13:53

## 2025-06-01 NOTE — PROGRESS NOTE ADULT - SUBJECTIVE AND OBJECTIVE BOX
INTERVAL HPI/OVERNIGHT EVENTS: PORFIRIO    STATUS POST:  5/27: exlap, alec patch repair of anterior gastric perf, abdominal washout, EGD, IVF 1.7L, 300 UOP, Cefotetan     POST OPERATIVE DAY #: 5    SUBJECTIVE: Pt reports chest pain has improved since NGT came out. Reports passing flatus but no BMs. Pain is tolerable. Hasn't ambulated yet but has been OOBC. Denies any nausea or vomiting.    MEDICATIONS  (STANDING):  fluconAZOLE IVPB 400 milliGRAM(s) IV Intermittent every 24 hours  heparin   Injectable 5000 Unit(s) SubCutaneous every 8 hours  lactated ringers. 1000 milliLiter(s) (140 mL/Hr) IV Continuous <Continuous>  nicotine -   7 mG/24Hr(s) Patch 1 Patch Transdermal daily  pantoprazole  Injectable 40 milliGRAM(s) IV Push daily  piperacillin/tazobactam IVPB.. 3.375 Gram(s) IV Intermittent every 8 hours    MEDICATIONS  (PRN):  acetaminophen   IVPB .. 1000 milliGRAM(s) IV Intermittent once PRN Temp greater or equal to 38C (100.4F), Mild Pain (1 - 3), Moderate Pain (4 - 6)  benzocaine/menthol Lozenge 1 Lozenge Oral every 4 hours PRN Sore Throat  HYDROmorphone  Injectable 0.5 milliGRAM(s) IV Push every 4 hours PRN Severe Pain (7 - 10)  HYDROmorphone  Injectable 0.25 milliGRAM(s) IV Push every 4 hours PRN Moderate Pain (4 - 6)  ondansetron Injectable 4 milliGRAM(s) IV Push every 6 hours PRN Nausea and/or Vomiting      Vital Signs Last 24 Hrs  T(C): 36.4 (01 Jun 2025 05:47), Max: 37.3 (31 May 2025 17:07)  T(F): 97.5 (01 Jun 2025 05:47), Max: 99.2 (31 May 2025 17:07)  HR: 53 (01 Jun 2025 05:47) (53 - 77)  BP: 140/77 (01 Jun 2025 05:47) (129/68 - 140/77)  BP(mean): --  RR: 18 (01 Jun 2025 05:47) (16 - 18)  SpO2: 97% (01 Jun 2025 05:47) (95% - 97%)    Parameters below as of 01 Jun 2025 05:47  Patient On (Oxygen Delivery Method): room air        PHYSICAL EXAM:      Constitutional: A&Ox3, nad  Respiratory: non labored breathing, no respiratory distress  Cardiovascular: NSR, RRR  Gastrointestinal: abd soft, mildly TTP around incision, ND                 Incision: C/D/I with staples at midline  Genitourinary: voiding  Extremities: (-) edema, wwp, SCDs in place                  I&O's Detail    31 May 2025 07:01  -  01 Jun 2025 07:00  --------------------------------------------------------  IN:    IV PiggyBack: 200 mL    IV PiggyBack: 200 mL    IV PiggyBack: 275 mL    Lactated Ringers: 2800 mL  Total IN: 3475 mL    OUT:    Nasogastric/Oral tube (mL): 650 mL    Voided (mL): 1400 mL  Total OUT: 2050 mL    Total NET: 1425 mL          LABS:                        13.4   4.23  )-----------( 234      ( 01 Jun 2025 06:00 )             39.7     06-01    140  |  106  |  14  ----------------------------<  80  3.9   |  23  |  1.08    Ca    8.6      01 Jun 2025 06:00  Phos  3.6     06-01  Mg     1.8     06-01        Urinalysis Basic - ( 01 Jun 2025 06:00 )    Color: x / Appearance: x / SG: x / pH: x  Gluc: 80 mg/dL / Ketone: x  / Bili: x / Urobili: x   Blood: x / Protein: x / Nitrite: x   Leuk Esterase: x / RBC: x / WBC x   Sq Epi: x / Non Sq Epi: x / Bacteria: x        RADIOLOGY & ADDITIONAL STUDIES:

## 2025-06-01 NOTE — PROGRESS NOTE ADULT - ASSESSMENT
28 y/o M with PMH of substance use admitted for gastric perforation s/p ex lap, alec patch repair of anterior gastric perf, abdominal washout, EGD    Post op state  Patient reports pain controlled, some flatus, no BM, NGT in place.   Currently on Pip/Tazo 3.375 and Fluconazole. Monitor QTc while on Fluconazole  Pain management per primary team, gentle IVF, monitor electrolytes   Patient with sternal pain reproducible on palpation, no hypoxia, no SOB, EKG reviewed, no ischemic changes, continue to monitor     Substance use disorder  Patient reports smoking crack cocaine, denies Opioid use. Utox positive for Fentanyl. Currently without signs of withdrawal. COWS q6h  Please notify medicine if patient with withdrawal symptoms      Possible HIV exposure  Patient reports needle tick injury in February, was started on prophylaxis  HIV testing negative     DVT ppx: SQH Render Risk Assessment In Note?: no Detail Level: Simple Additional Notes: Wound care education, instructions, and Vaseline given Additional Notes: Recommended 3 month follow up

## 2025-06-01 NOTE — PROGRESS NOTE ADULT - ASSESSMENT
29M PSHx appendectomy, admitted for findings of scattered foci of free air concerning for possible bowel perforation, physical exam remarkable for significant tenderness to palpation diffusely. Taken to OR urgently. Now s/p exlap, alec patch repair of anterior gastric perf, abdominal washout, EGD (5/27).     NPO/IVF  Fluc/Zosyn (5/27-5/31)  Pain/nausea PRN  SCDs/SQH/OOBA  AM labs  Dispo: shelter; PT no needs 29M PSHx appendectomy, admitted for findings of scattered foci of free air concerning for possible bowel perforation, physical exam remarkable for significant tenderness to palpation diffusely. Taken to OR urgently. Now s/p exlap, alec patch repair of anterior gastric perf, abdominal washout, EGD (5/27).     CLD  Fluc/Zosyn (5/27-5/31)  Pain/nausea PRN  SCDs/SQH/OOBA  AM labs  Dispo: shelter; PT no needs

## 2025-06-01 NOTE — PROGRESS NOTE ADULT - SUBJECTIVE AND OBJECTIVE BOX
INTERVAL EVENTS: no acute events or complaints    PAST MEDICAL & SURGICAL HISTORY:  No pertinent past medical history    Asthma    No significant past surgical history    No significant past surgical history    S/P appendectomy        MEDICATIONS  (STANDING):  fluconAZOLE IVPB 400 milliGRAM(s) IV Intermittent every 24 hours  heparin   Injectable 5000 Unit(s) SubCutaneous every 8 hours  nicotine -   7 mG/24Hr(s) Patch 1 Patch Transdermal daily  pantoprazole  Injectable 40 milliGRAM(s) IV Push daily  piperacillin/tazobactam IVPB.. 3.375 Gram(s) IV Intermittent every 8 hours    MEDICATIONS  (PRN):  acetaminophen   IVPB .. 1000 milliGRAM(s) IV Intermittent once PRN Temp greater or equal to 38C (100.4F), Mild Pain (1 - 3), Moderate Pain (4 - 6)  benzocaine/menthol Lozenge 1 Lozenge Oral every 4 hours PRN Sore Throat  HYDROmorphone  Injectable 0.5 milliGRAM(s) IV Push every 4 hours PRN Severe Pain (7 - 10)  HYDROmorphone  Injectable 0.25 milliGRAM(s) IV Push every 4 hours PRN Moderate Pain (4 - 6)  ondansetron Injectable 4 milliGRAM(s) IV Push every 6 hours PRN Nausea and/or Vomiting    T(F): 98.1 (06-01-25 @ 09:47), Max: 99.2 (05-31-25 @ 17:07)  HR: 50 (06-01-25 @ 09:47) (50 - 77)  BP: 130/72 (06-01-25 @ 09:47) (129/68 - 140/77)  BP(mean): --  ABP: --  ABP(mean): --  RR: 16 (06-01-25 @ 09:47) (16 - 18)  SpO2: 98% (06-01-25 @ 09:47) (95% - 98%)  CVP(mm Hg): --    I/O Detail 24H    31 May 2025 07:01  -  01 Jun 2025 07:00  --------------------------------------------------------  IN:    IV PiggyBack: 200 mL    IV PiggyBack: 200 mL    IV PiggyBack: 275 mL    Lactated Ringers: 2800 mL  Total IN: 3475 mL    OUT:    Nasogastric/Oral tube (mL): 650 mL    Voided (mL): 1400 mL  Total OUT: 2050 mL    Total NET: 1425 mL      01 Jun 2025 07:01  -  01 Jun 2025 10:37  --------------------------------------------------------  IN:    IV PiggyBack: 75 mL    IV PiggyBack: 100 mL    Lactated Ringers: 420 mL  Total IN: 595 mL    OUT:    Voided (mL): 150 mL  Total OUT: 150 mL    Total NET: 445 mL          PHYSICAL EXAM:  General: AOX3, NAD, lying in bed, speaking in full sentences, no labored breathing on RA  HEENT: AT/NC, no facial asymmetry, NGT in place   Lungs: no crackles, no wheezes, some sternal discomfort on ambulation   Heart: regular  Abdomen: limited exam, no tenderness, mild distension, unable to assess BS  Extremities: warm, no edema, no calf tenderness, no tenderness, no focal deficit       LABS:  CBC 06-01-25 @ 06:00                        13.4   4.23  )-----------( 234                   39.7     Hgb trend: 13.4 <-- , 14.0 <-- , 14.1 <--   WBC trend: 4.23 <-- , 4.20 <-- , 5.45 <--       CMP 06-01-25 @ 06:00    140  |  106  |  14  ----------------------------<  80  3.9   |  23  |  1.08    Ca    8.6      06-01-25 @ 06:00  Phos  3.6     06-01  Mg     1.8     06-01      Serum Cr (eGFR) trend: 1.08 (95) <-- , 1.00 (104) <-- , 0.97 (108) <--           Cardiac Markers         STUDIES:

## 2025-06-02 LAB
ANION GAP SERPL CALC-SCNC: 11 MMOL/L — SIGNIFICANT CHANGE UP (ref 5–17)
BUN SERPL-MCNC: 9 MG/DL — SIGNIFICANT CHANGE UP (ref 7–23)
CALCIUM SERPL-MCNC: 9 MG/DL — SIGNIFICANT CHANGE UP (ref 8.4–10.5)
CHLORIDE SERPL-SCNC: 104 MMOL/L — SIGNIFICANT CHANGE UP (ref 96–108)
CO2 SERPL-SCNC: 23 MMOL/L — SIGNIFICANT CHANGE UP (ref 22–31)
CREAT SERPL-MCNC: 0.97 MG/DL — SIGNIFICANT CHANGE UP (ref 0.5–1.3)
EGFR: 108 ML/MIN/1.73M2 — SIGNIFICANT CHANGE UP
EGFR: 108 ML/MIN/1.73M2 — SIGNIFICANT CHANGE UP
GLUCOSE SERPL-MCNC: 99 MG/DL — SIGNIFICANT CHANGE UP (ref 70–99)
HCT VFR BLD CALC: 47.6 % — SIGNIFICANT CHANGE UP (ref 39–50)
HGB BLD-MCNC: 15.5 G/DL — SIGNIFICANT CHANGE UP (ref 13–17)
MAGNESIUM SERPL-MCNC: 2 MG/DL — SIGNIFICANT CHANGE UP (ref 1.6–2.6)
MCHC RBC-ENTMCNC: 25.7 PG — LOW (ref 27–34)
MCHC RBC-ENTMCNC: 32.6 G/DL — SIGNIFICANT CHANGE UP (ref 32–36)
MCV RBC AUTO: 79.1 FL — LOW (ref 80–100)
NRBC BLD AUTO-RTO: 0 /100 WBCS — SIGNIFICANT CHANGE UP (ref 0–0)
PHOSPHATE SERPL-MCNC: 3.1 MG/DL — SIGNIFICANT CHANGE UP (ref 2.5–4.5)
PLATELET # BLD AUTO: 271 K/UL — SIGNIFICANT CHANGE UP (ref 150–400)
POTASSIUM SERPL-MCNC: 4.2 MMOL/L — SIGNIFICANT CHANGE UP (ref 3.5–5.3)
POTASSIUM SERPL-SCNC: 4.2 MMOL/L — SIGNIFICANT CHANGE UP (ref 3.5–5.3)
RBC # BLD: 6.02 M/UL — HIGH (ref 4.2–5.8)
RBC # FLD: 13.4 % — SIGNIFICANT CHANGE UP (ref 10.3–14.5)
SODIUM SERPL-SCNC: 138 MMOL/L — SIGNIFICANT CHANGE UP (ref 135–145)
WBC # BLD: 4.1 K/UL — SIGNIFICANT CHANGE UP (ref 3.8–10.5)
WBC # FLD AUTO: 4.1 K/UL — SIGNIFICANT CHANGE UP (ref 3.8–10.5)

## 2025-06-02 PROCEDURE — 99232 SBSQ HOSP IP/OBS MODERATE 35: CPT

## 2025-06-02 RX ADMIN — NICOTINE POLACRILEX 1 PATCH: 4 GUM, CHEWING ORAL at 17:19

## 2025-06-02 RX ADMIN — OXYCODONE HYDROCHLORIDE 5 MILLIGRAM(S): 30 TABLET ORAL at 06:10

## 2025-06-02 RX ADMIN — Medication 40 MILLIGRAM(S): at 12:25

## 2025-06-02 RX ADMIN — Medication 5 MILLIGRAM(S): at 21:51

## 2025-06-02 RX ADMIN — NICOTINE POLACRILEX 1 PATCH: 4 GUM, CHEWING ORAL at 06:18

## 2025-06-02 RX ADMIN — HEPARIN SODIUM 5000 UNIT(S): 1000 INJECTION INTRAVENOUS; SUBCUTANEOUS at 21:51

## 2025-06-02 RX ADMIN — HEPARIN SODIUM 5000 UNIT(S): 1000 INJECTION INTRAVENOUS; SUBCUTANEOUS at 13:54

## 2025-06-02 RX ADMIN — NICOTINE POLACRILEX 1 PATCH: 4 GUM, CHEWING ORAL at 12:25

## 2025-06-02 RX ADMIN — HEPARIN SODIUM 5000 UNIT(S): 1000 INJECTION INTRAVENOUS; SUBCUTANEOUS at 05:16

## 2025-06-02 RX ADMIN — OXYCODONE HYDROCHLORIDE 5 MILLIGRAM(S): 30 TABLET ORAL at 22:12

## 2025-06-02 RX ADMIN — NICOTINE POLACRILEX 1 PATCH: 4 GUM, CHEWING ORAL at 12:59

## 2025-06-02 RX ADMIN — OXYCODONE HYDROCHLORIDE 5 MILLIGRAM(S): 30 TABLET ORAL at 05:16

## 2025-06-02 NOTE — PROGRESS NOTE ADULT - ASSESSMENT
30 y/o M with PMH of substance use admitted for gastric perforation s/p ex lap, alec patch repair of anterior gastric perf, abdominal washout, EGD    Post op state  Patient reports pain controlled, NGT removed, diet being advanced with good tolerance, currently on FLD  s/p  Pip/Tazo 3.375 and Fluconazole  Pain management per primary team     Substance use disorder  Patient reports smoking crack cocaine, denies Opioid use. Utox positive for Fentanyl. Currently without signs of withdrawal  Please notify medicine if patient with withdrawal symptoms      DVT ppx: SQH  Discussed with primary team  SW evaluation for shelter

## 2025-06-02 NOTE — PROGRESS NOTE ADULT - SUBJECTIVE AND OBJECTIVE BOX
STATUS POST:  exlap, alec patch repair of anterior gastric perf, abdominal washout, EGD    POST OPERATIVE DAY #: 6    SUBJECTIVE: Pt seen and examined by chief resident on AM rounds. Pt doing well, resting comfortably on bed. Pain controlled. Clear liquid diet tolerated. +F/+BM. No nausea or vomiting. Ambulating out of bed. No complaints at this time.     Vital Signs Last 24 Hrs  T(C): 36.6 (02 Jun 2025 05:13), Max: 36.8 (01 Jun 2025 16:50)  T(F): 97.8 (02 Jun 2025 05:13), Max: 98.3 (01 Jun 2025 16:50)  HR: 55 (02 Jun 2025 05:13) (50 - 75)  BP: 120/70 (02 Jun 2025 05:13) (120/70 - 131/70)  BP(mean): --  RR: 16 (02 Jun 2025 05:13) (15 - 18)  SpO2: 100% (02 Jun 2025 05:13) (97% - 100%)    Parameters below as of 02 Jun 2025 05:13  Patient On (Oxygen Delivery Method): room air        I&O's Summary    01 Jun 2025 07:01  -  02 Jun 2025 07:00  --------------------------------------------------------  IN: 1635 mL / OUT: 1450 mL / NET: 185 mL        Physical Exam:  General Appearance: Appears well, NAD  Pulmonary: Nonlabored breathing, no respiratory distress  Abdomen: Soft, nondistended, nontender, midline incision clean and dry and intact  Extremities: WWP, SCD's in place     LABS:                        13.4   4.23  )-----------( 234      ( 01 Jun 2025 06:00 )             39.7     06-01    140  |  106  |  14  ----------------------------<  80  3.9   |  23  |  1.08    Ca    8.6      01 Jun 2025 06:00  Phos  3.6     06-01  Mg     1.8     06-01        Urinalysis Basic - ( 01 Jun 2025 06:00 )    Color: x / Appearance: x / SG: x / pH: x  Gluc: 80 mg/dL / Ketone: x  / Bili: x / Urobili: x   Blood: x / Protein: x / Nitrite: x   Leuk Esterase: x / RBC: x / WBC x   Sq Epi: x / Non Sq Epi: x / Bacteria: x

## 2025-06-02 NOTE — PROGRESS NOTE ADULT - SUBJECTIVE AND OBJECTIVE BOX
SUBJECTIVE:  Patient was seen and examined at bedside. Patient feeling improved, on FLD, ambulating, still with some pain with movement but overall improved. No signs of withdrawal. No other complaints or events reported.    Review of systems: Review of systems negative unless otherwise documented elsewhere in note.         MEDICATIONS:  MEDICATIONS  (STANDING):  heparin   Injectable 5000 Unit(s) SubCutaneous every 8 hours  nicotine -   7 mG/24Hr(s) Patch 1 Patch Transdermal daily  pantoprazole  Injectable 40 milliGRAM(s) IV Push daily    MEDICATIONS  (PRN):  acetaminophen     Tablet .. 1000 milliGRAM(s) Oral every 6 hours PRN Mild Pain (1 - 3)  benzocaine/menthol Lozenge 1 Lozenge Oral every 4 hours PRN Sore Throat  HYDROmorphone  Injectable 0.2 milliGRAM(s) IV Push every 6 hours PRN breakthrough pain  melatonin 5 milliGRAM(s) Oral at bedtime PRN Insomnia  ondansetron Injectable 4 milliGRAM(s) IV Push every 6 hours PRN Nausea and/or Vomiting  oxyCODONE    IR 5 milliGRAM(s) Oral every 6 hours PRN Severe Pain (7 - 10)  oxyCODONE    IR 2.5 milliGRAM(s) Oral every 6 hours PRN Moderate Pain (4 - 6)      Allergies    peanuts (Anaphylaxis)  No Known Drug Allergies  Nuts (Hives)    Intolerances        OBJECTIVE:  Vital Signs Last 24 Hrs  T(C): 36.7 (02 Jun 2025 10:12), Max: 37.2 (02 Jun 2025 09:00)  T(F): 98.1 (02 Jun 2025 10:12), Max: 98.9 (02 Jun 2025 09:00)  HR: 66 (02 Jun 2025 10:12) (54 - 75)  BP: 121/- (02 Jun 2025 10:12) (120/70 - 146/81)  BP(mean): --  RR: 17 (02 Jun 2025 10:12) (15 - 18)  SpO2: 98% (02 Jun 2025 10:12) (97% - 100%)    Parameters below as of 02 Jun 2025 10:12  Patient On (Oxygen Delivery Method): room air      I&O's Summary    01 Jun 2025 07:01  -  02 Jun 2025 07:00  --------------------------------------------------------  IN: 1635 mL / OUT: 1450 mL / NET: 185 mL        PHYSICAL EXAM:  General: AOX3, NAD, speaking in full sentences, no labored breathing on RA  HEENT: AT/NC, no facial asymmetry   Lungs: no labored breathing, no   Heart:  Abdomen:  Extremities:    LABS:                        15.5   4.10  )-----------( 271      ( 02 Jun 2025 05:30 )             47.6     06-02    138  |  104  |  9   ----------------------------<  99  4.2   |  23  |  0.97    Ca    9.0      02 Jun 2025 05:30  Phos  3.1     06-02  Mg     2.0     06-02          CAPILLARY BLOOD GLUCOSE        Urinalysis Basic - ( 02 Jun 2025 05:30 )    Color: x / Appearance: x / SG: x / pH: x  Gluc: 99 mg/dL / Ketone: x  / Bili: x / Urobili: x   Blood: x / Protein: x / Nitrite: x   Leuk Esterase: x / RBC: x / WBC x   Sq Epi: x / Non Sq Epi: x / Bacteria: x        MICRODATA:      RADIOLOGY/OTHER STUDIES:

## 2025-06-02 NOTE — PROGRESS NOTE ADULT - ASSESSMENT
29M PSHx appendectomy, admitted for findings of scattered foci of free air concerning for possible bowel perforation, physical exam remarkable for significant tenderness to palpation diffusely. Taken to OR urgently. Now s/p exlap, alec patch repair of anterior gastric perf, abdominal washout, EGD (5/27). S/p abx treatment with fluc/zosyn (5/27-6/1).     Adv diet  Pain/nausea PRN  SCDs/SQH/OOBA  AM labs  Dispo: shelter; PT no needs

## 2025-06-03 LAB
ANION GAP SERPL CALC-SCNC: 11 MMOL/L — SIGNIFICANT CHANGE UP (ref 5–17)
BUN SERPL-MCNC: 4 MG/DL — LOW (ref 7–23)
CALCIUM SERPL-MCNC: 8.8 MG/DL — SIGNIFICANT CHANGE UP (ref 8.4–10.5)
CHLORIDE SERPL-SCNC: 107 MMOL/L — SIGNIFICANT CHANGE UP (ref 96–108)
CO2 SERPL-SCNC: 22 MMOL/L — SIGNIFICANT CHANGE UP (ref 22–31)
CREAT SERPL-MCNC: 0.94 MG/DL — SIGNIFICANT CHANGE UP (ref 0.5–1.3)
EGFR: 112 ML/MIN/1.73M2 — SIGNIFICANT CHANGE UP
EGFR: 112 ML/MIN/1.73M2 — SIGNIFICANT CHANGE UP
GLUCOSE SERPL-MCNC: 94 MG/DL — SIGNIFICANT CHANGE UP (ref 70–99)
HCT VFR BLD CALC: 41.9 % — SIGNIFICANT CHANGE UP (ref 39–50)
HGB BLD-MCNC: 13.8 G/DL — SIGNIFICANT CHANGE UP (ref 13–17)
MAGNESIUM SERPL-MCNC: 1.7 MG/DL — SIGNIFICANT CHANGE UP (ref 1.6–2.6)
MCHC RBC-ENTMCNC: 26 PG — LOW (ref 27–34)
MCHC RBC-ENTMCNC: 32.9 G/DL — SIGNIFICANT CHANGE UP (ref 32–36)
MCV RBC AUTO: 78.9 FL — LOW (ref 80–100)
NRBC BLD AUTO-RTO: 0 /100 WBCS — SIGNIFICANT CHANGE UP (ref 0–0)
PHOSPHATE SERPL-MCNC: 3.4 MG/DL — SIGNIFICANT CHANGE UP (ref 2.5–4.5)
PLATELET # BLD AUTO: 266 K/UL — SIGNIFICANT CHANGE UP (ref 150–400)
POTASSIUM SERPL-MCNC: 4.6 MMOL/L — SIGNIFICANT CHANGE UP (ref 3.5–5.3)
POTASSIUM SERPL-SCNC: 4.6 MMOL/L — SIGNIFICANT CHANGE UP (ref 3.5–5.3)
RBC # BLD: 5.31 M/UL — SIGNIFICANT CHANGE UP (ref 4.2–5.8)
RBC # FLD: 13.6 % — SIGNIFICANT CHANGE UP (ref 10.3–14.5)
SODIUM SERPL-SCNC: 140 MMOL/L — SIGNIFICANT CHANGE UP (ref 135–145)
WBC # BLD: 3.3 K/UL — LOW (ref 3.8–10.5)
WBC # FLD AUTO: 3.3 K/UL — LOW (ref 3.8–10.5)

## 2025-06-03 RX ORDER — MAGNESIUM SULFATE 500 MG/ML
2 SYRINGE (ML) INJECTION ONCE
Refills: 0 | Status: COMPLETED | OUTPATIENT
Start: 2025-06-03 | End: 2025-06-03

## 2025-06-03 RX ADMIN — Medication 25 GRAM(S): at 08:12

## 2025-06-03 RX ADMIN — Medication 40 MILLIGRAM(S): at 13:08

## 2025-06-03 RX ADMIN — NICOTINE POLACRILEX 1 PATCH: 4 GUM, CHEWING ORAL at 06:50

## 2025-06-03 RX ADMIN — NICOTINE POLACRILEX 1 PATCH: 4 GUM, CHEWING ORAL at 13:08

## 2025-06-03 RX ADMIN — HEPARIN SODIUM 5000 UNIT(S): 1000 INJECTION INTRAVENOUS; SUBCUTANEOUS at 13:08

## 2025-06-03 RX ADMIN — HEPARIN SODIUM 5000 UNIT(S): 1000 INJECTION INTRAVENOUS; SUBCUTANEOUS at 22:11

## 2025-06-03 RX ADMIN — NICOTINE POLACRILEX 1 PATCH: 4 GUM, CHEWING ORAL at 13:41

## 2025-06-03 RX ADMIN — HEPARIN SODIUM 5000 UNIT(S): 1000 INJECTION INTRAVENOUS; SUBCUTANEOUS at 06:10

## 2025-06-03 NOTE — PROGRESS NOTE ADULT - ASSESSMENT
29M PSHx appendectomy, admitted for findings of scattered foci of free air concerning for possible bowel perforation, physical exam remarkable for significant tenderness to palpation diffusely. Taken to OR urgently. Now s/p exlap, alec patch repair of anterior gastric perf, abdominal washout, EGD (5/27). S/p abx treatment with fluc/zosyn (5/27-6/1).     FLD  Pain/nausea PRN  SCDs/SQH/OOBA  AM labs  Dispo: shelter; PT no needs

## 2025-06-03 NOTE — PROGRESS NOTE ADULT - SUBJECTIVE AND OBJECTIVE BOX
STATUS POST:  Gastric perforation repair, Exploratory laparotomy, Repair of gastric or duodenal ulcer      POST OPERATIVE DAY #: 7    SUBJECTIVE: Pt seen and examined by chief resident on AM rounds. Pt doing well, resting comfortably on bed. Pain controlled. Full liquid diet tolerated. +F/+BM. No nausea or vomiting. Ambulating out of bed. No complaints at this time.     Vital Signs Last 24 Hrs  T(C): 36.7 (03 Jun 2025 04:42), Max: 37.2 (02 Jun 2025 09:00)  T(F): 98.1 (03 Jun 2025 04:42), Max: 98.9 (02 Jun 2025 09:00)  HR: 59 (03 Jun 2025 04:42) (59 - 81)  BP: 129/84 (03 Jun 2025 04:42) (121/- - 146/81)  BP(mean): 99 (02 Jun 2025 15:36) (99 - 99)  RR: 17 (03 Jun 2025 04:42) (15 - 17)  SpO2: 100% (03 Jun 2025 04:42) (98% - 100%)    Parameters below as of 03 Jun 2025 04:42  Patient On (Oxygen Delivery Method): room air        I&O's Summary    02 Jun 2025 07:01  -  03 Jun 2025 07:00  --------------------------------------------------------  IN: 840 mL / OUT: 3900 mL / NET: -3060 mL        Physical Exam:  General Appearance: Appears well, NAD  Pulmonary: Nonlabored breathing, no respiratory distress  Abdomen: Soft, nondistended, nontender, midline incision clean dry and intact   Extremities: WWP, SCD's in place     LABS:                        13.8   3.30  )-----------( 266      ( 03 Jun 2025 05:58 )             41.9     06-03    140  |  107  |  4[L]  ----------------------------<  94  4.6   |  22  |  0.94    Ca    8.8      03 Jun 2025 05:58  Phos  3.4     06-03  Mg     1.7     06-03        Urinalysis Basic - ( 03 Jun 2025 05:58 )    Color: x / Appearance: x / SG: x / pH: x  Gluc: 94 mg/dL / Ketone: x  / Bili: x / Urobili: x   Blood: x / Protein: x / Nitrite: x   Leuk Esterase: x / RBC: x / WBC x   Sq Epi: x / Non Sq Epi: x / Bacteria: x

## 2025-06-04 ENCOUNTER — TRANSCRIPTION ENCOUNTER (OUTPATIENT)
Age: 30
End: 2025-06-04

## 2025-06-04 VITALS
OXYGEN SATURATION: 98 % | SYSTOLIC BLOOD PRESSURE: 112 MMHG | TEMPERATURE: 97 F | HEART RATE: 78 BPM | RESPIRATION RATE: 18 BRPM | DIASTOLIC BLOOD PRESSURE: 76 MMHG

## 2025-06-04 PROCEDURE — 84100 ASSAY OF PHOSPHORUS: CPT

## 2025-06-04 PROCEDURE — 80048 BASIC METABOLIC PNL TOTAL CA: CPT

## 2025-06-04 PROCEDURE — 74240 X-RAY XM UPR GI TRC 1CNTRST: CPT

## 2025-06-04 PROCEDURE — 82962 GLUCOSE BLOOD TEST: CPT

## 2025-06-04 PROCEDURE — 97530 THERAPEUTIC ACTIVITIES: CPT

## 2025-06-04 PROCEDURE — 80307 DRUG TEST PRSMV CHEM ANLYZR: CPT

## 2025-06-04 PROCEDURE — 86901 BLOOD TYPING SEROLOGIC RH(D): CPT

## 2025-06-04 PROCEDURE — 85025 COMPLETE CBC W/AUTO DIFF WBC: CPT

## 2025-06-04 PROCEDURE — 93005 ELECTROCARDIOGRAM TRACING: CPT

## 2025-06-04 PROCEDURE — 85610 PROTHROMBIN TIME: CPT

## 2025-06-04 PROCEDURE — 99232 SBSQ HOSP IP/OBS MODERATE 35: CPT

## 2025-06-04 PROCEDURE — 74018 RADEX ABDOMEN 1 VIEW: CPT

## 2025-06-04 PROCEDURE — 85027 COMPLETE CBC AUTOMATED: CPT

## 2025-06-04 PROCEDURE — 99285 EMERGENCY DEPT VISIT HI MDM: CPT

## 2025-06-04 PROCEDURE — 88305 TISSUE EXAM BY PATHOLOGIST: CPT

## 2025-06-04 PROCEDURE — C9399: CPT

## 2025-06-04 PROCEDURE — 85730 THROMBOPLASTIN TIME PARTIAL: CPT

## 2025-06-04 PROCEDURE — 87389 HIV-1 AG W/HIV-1&-2 AB AG IA: CPT

## 2025-06-04 PROCEDURE — 86900 BLOOD TYPING SEROLOGIC ABO: CPT

## 2025-06-04 PROCEDURE — 36415 COLL VENOUS BLD VENIPUNCTURE: CPT

## 2025-06-04 PROCEDURE — 86850 RBC ANTIBODY SCREEN: CPT

## 2025-06-04 PROCEDURE — 83735 ASSAY OF MAGNESIUM: CPT

## 2025-06-04 RX ADMIN — NICOTINE POLACRILEX 1 PATCH: 4 GUM, CHEWING ORAL at 07:17

## 2025-06-04 RX ADMIN — HEPARIN SODIUM 5000 UNIT(S): 1000 INJECTION INTRAVENOUS; SUBCUTANEOUS at 06:05

## 2025-06-04 NOTE — DISCHARGE NOTE NURSING/CASE MANAGEMENT/SOCIAL WORK - NSDCFUADDAPPT_GEN_ALL_CORE_FT
Please follow up with Dr. Jacobson in one week; you may call the office to make an appointment at your earliest convenience [phone].

## 2025-06-04 NOTE — PROGRESS NOTE ADULT - ASSESSMENT
30 y/o M with PMH of substance use admitted for gastric perforation s/p ex lap, alec patch repair of anterior gastric perf, abdominal washout, EGD    Post op state  Patient reports pain controlled, diet advanced  s/p  Pip/Tazo 3.375 and Fluconazole  Pain management per primary team     Substance use disorder  Patient reports smoking crack cocaine, denies Opioid use. No signs of withdrawal   SBIRT evaluation      DVT ppx: SQH  Discussed with primary team

## 2025-06-04 NOTE — PROGRESS NOTE ADULT - SUBJECTIVE AND OBJECTIVE BOX
SUBJECTIVE:  Patient was seen and examined at bedside. Feeling fine, denies complaints. Diet advanced with good tolerance, reports will meet with his mother after discharge. Not interested in substance use counselling. No other complaints or events reported.     Review of systems: No fever, chills, dizziness, HA, Changes in vision, CP, dyspnea, nausea or vomiting, dysuria, changes in bowel movements, LE edema. Rest of 12 point Review of systems negative unless otherwise documented elsewhere in note.         MEDICATIONS:  MEDICATIONS  (STANDING):  heparin   Injectable 5000 Unit(s) SubCutaneous every 8 hours  nicotine -   7 mG/24Hr(s) Patch 1 Patch Transdermal daily  pantoprazole  Injectable 40 milliGRAM(s) IV Push daily    MEDICATIONS  (PRN):  acetaminophen     Tablet .. 1000 milliGRAM(s) Oral every 6 hours PRN Mild Pain (1 - 3)  benzocaine/menthol Lozenge 1 Lozenge Oral every 4 hours PRN Sore Throat  melatonin 5 milliGRAM(s) Oral at bedtime PRN Insomnia  ondansetron Injectable 4 milliGRAM(s) IV Push every 6 hours PRN Nausea and/or Vomiting      Allergies    peanuts (Anaphylaxis)  No Known Drug Allergies  Nuts (Hives)    Intolerances        OBJECTIVE:  Vital Signs Last 24 Hrs  T(C): 36.5 (04 Jun 2025 08:30), Max: 37.2 (03 Jun 2025 16:52)  T(F): 97.7 (04 Jun 2025 08:30), Max: 98.9 (03 Jun 2025 16:52)  HR: 71 (04 Jun 2025 08:30) (70 - 82)  BP: 118/75 (04 Jun 2025 08:30) (118/75 - 135/78)  BP(mean): --  RR: 17 (04 Jun 2025 08:30) (17 - 17)  SpO2: 100% (04 Jun 2025 08:30) (98% - 100%)    Parameters below as of 04 Jun 2025 08:30  Patient On (Oxygen Delivery Method): room air      I&O's Summary    03 Jun 2025 07:01  -  04 Jun 2025 07:00  --------------------------------------------------------  IN: 1050 mL / OUT: 1650 mL / NET: -600 mL    04 Jun 2025 07:01  -  04 Jun 2025 11:37  --------------------------------------------------------  IN: 400 mL / OUT: 300 mL / NET: 100 mL        PHYSICAL EXAM:  General: AOX3, NAD, lying in bed, speaking in full sentences, no labored breathing on RA  HEENT: AT/NC, no facial asymmetry   Lungs: no crackles, no wheezes  Heart: regualr  Abdomen: soft, no tenderness, no distension  Extremities: warm, no edema, no tenderness, no calf tenderness, no focal deficit     LABS:                        13.8   3.30  )-----------( 266      ( 03 Jun 2025 05:58 )             41.9     06-03    140  |  107  |  4[L]  ----------------------------<  94  4.6   |  22  |  0.94    Ca    8.8      03 Jun 2025 05:58  Phos  3.4     06-03  Mg     1.7     06-03          CAPILLARY BLOOD GLUCOSE        Urinalysis Basic - ( 03 Jun 2025 05:58 )    Color: x / Appearance: x / SG: x / pH: x  Gluc: 94 mg/dL / Ketone: x  / Bili: x / Urobili: x   Blood: x / Protein: x / Nitrite: x   Leuk Esterase: x / RBC: x / WBC x   Sq Epi: x / Non Sq Epi: x / Bacteria: x        MICRODATA:      RADIOLOGY/OTHER STUDIES:

## 2025-06-04 NOTE — DISCHARGE NOTE NURSING/CASE MANAGEMENT/SOCIAL WORK - NSDCVIVACCINE_GEN_ALL_CORE_FT
Tdap; 16-Jan-2023 14:20; Armando Beaulieu (RYANN); [employer]; [id number] (Exp. Date: 08-Dec-2024); IntraMuscular; Deltoid Right.; 0.5 milliLiter(s); VIS (VIS Published: 09-May-2013, VIS Presented: 16-Jan-2023);

## 2025-06-04 NOTE — PROGRESS NOTE ADULT - ASSESSMENT
29M PSHx appendectomy, admitted for findings of scattered foci of free air concerning for possible bowel perforation, physical exam remarkable for significant tenderness to palpation diffusely. Taken to OR urgently. Now s/p exlap, alec patch repair of anterior gastric perf, abdominal washout, EGD (5/27). S/p abx treatment with fluc/zosyn (5/27-6/1).     Soft  Pain/nausea PRN  SCDs/SQH/OOBA  No AM labs  Dispo: shelter; PT no needs

## 2025-06-04 NOTE — PROGRESS NOTE ADULT - SUBJECTIVE AND OBJECTIVE BOX
STATUS POST:  Gastric perforation repair    Exploratory laparotomy    Repair of gastric or duodenal ulcer        POST OPERATIVE DAY #: 8    SUBJECTIVE: Pt seen and examined by chief resident on AM rounds. Pt doing well, resting comfortably on bed. Denies abdominal pain. Soft diet tolerated. +F/+BM. No nausea or vomiting. Feels less bloated. Minimal burping.   Ambulating out of bed. No complaints at this time.     Vital Signs Last 24 Hrs  T(C): 36.4 (04 Jun 2025 04:59), Max: 37.2 (03 Jun 2025 16:52)  T(F): 97.6 (04 Jun 2025 04:59), Max: 98.9 (03 Jun 2025 16:52)  HR: 82 (04 Jun 2025 04:59) (65 - 82)  BP: 119/76 (04 Jun 2025 04:59) (119/68 - 135/78)  BP(mean): --  RR: 17 (04 Jun 2025 04:59) (17 - 17)  SpO2: 98% (04 Jun 2025 04:59) (98% - 99%)    Parameters below as of 04 Jun 2025 04:59  Patient On (Oxygen Delivery Method): room air        I&O's Summary    03 Jun 2025 07:01  -  04 Jun 2025 07:00  --------------------------------------------------------  IN: 1050 mL / OUT: 1650 mL / NET: -600 mL        Physical Exam:  General Appearance: Appears well, NAD  Pulmonary: Nonlabored breathing, no respiratory distress  Abdomen: Soft, nondistended, nontender, incisions clean and dry and intact  Extremities: WWP, SCD's in place     LABS:                        13.8   3.30  )-----------( 266      ( 03 Jun 2025 05:58 )             41.9     06-03    140  |  107  |  4[L]  ----------------------------<  94  4.6   |  22  |  0.94    Ca    8.8      03 Jun 2025 05:58  Phos  3.4     06-03  Mg     1.7     06-03        Urinalysis Basic - ( 03 Jun 2025 05:58 )    Color: x / Appearance: x / SG: x / pH: x  Gluc: 94 mg/dL / Ketone: x  / Bili: x / Urobili: x   Blood: x / Protein: x / Nitrite: x   Leuk Esterase: x / RBC: x / WBC x   Sq Epi: x / Non Sq Epi: x / Bacteria: x

## 2025-06-04 NOTE — DISCHARGE NOTE NURSING/CASE MANAGEMENT/SOCIAL WORK - PATIENT PORTAL LINK FT
You can access the FollowMyHealth Patient Portal offered by [facility] by registering at the following website: [email or web address]. By joining [facility]’s FollowMyHealth portal, you will also be able to view your health information using other applications (apps) compatible with our system.

## 2025-06-04 NOTE — PROGRESS NOTE ADULT - PROVIDER SPECIALTY LIST ADULT
Internal Medicine
Surgery
Internal Medicine
Surgery
Internal Medicine
Surgery
Internal Medicine

## 2025-06-10 DIAGNOSIS — K65.1 PERITONEAL ABSCESS: ICD-10-CM

## 2025-06-10 DIAGNOSIS — K66.8 OTHER SPECIFIED DISORDERS OF PERITONEUM: ICD-10-CM

## 2025-06-10 DIAGNOSIS — Z91.010 ALLERGY TO PEANUTS: ICD-10-CM

## 2025-06-10 DIAGNOSIS — F14.99 COCAINE USE, UNSPECIFIED WITH UNSPECIFIED COCAINE-INDUCED DISORDER: ICD-10-CM

## 2025-06-11 ENCOUNTER — EMERGENCY (EMERGENCY)
Age: 30
LOS: 1 days | End: 2025-06-11
Attending: EMERGENCY MEDICINE | Admitting: EMERGENCY MEDICINE
Payer: MEDICAID

## 2025-06-11 VITALS
HEART RATE: 77 BPM | DIASTOLIC BLOOD PRESSURE: 69 MMHG | SYSTOLIC BLOOD PRESSURE: 117 MMHG | TEMPERATURE: 98 F | OXYGEN SATURATION: 99 % | HEIGHT: 71 IN | RESPIRATION RATE: 16 BRPM

## 2025-06-11 DIAGNOSIS — K91.872 POSTPROCEDURAL SEROMA OF A DIGESTIVE SYSTEM ORGAN OR STRUCTURE FOLLOWING A DIGESTIVE SYSTEM PROCEDURE: ICD-10-CM

## 2025-06-11 DIAGNOSIS — J45.909 UNSPECIFIED ASTHMA, UNCOMPLICATED: ICD-10-CM

## 2025-06-11 DIAGNOSIS — G89.18 OTHER ACUTE POSTPROCEDURAL PAIN: ICD-10-CM

## 2025-06-11 DIAGNOSIS — K25.5 CHRONIC OR UNSPECIFIED GASTRIC ULCER WITH PERFORATION: ICD-10-CM

## 2025-06-11 DIAGNOSIS — Z91.018 ALLERGY TO OTHER FOODS: ICD-10-CM

## 2025-06-11 DIAGNOSIS — Z90.89 ACQUIRED ABSENCE OF OTHER ORGANS: ICD-10-CM

## 2025-06-11 DIAGNOSIS — Z98.890 OTHER SPECIFIED POSTPROCEDURAL STATES: ICD-10-CM

## 2025-06-11 DIAGNOSIS — Z91.010 ALLERGY TO PEANUTS: ICD-10-CM

## 2025-06-11 DIAGNOSIS — R10.11 RIGHT UPPER QUADRANT PAIN: ICD-10-CM

## 2025-06-11 LAB
ALBUMIN SERPL ELPH-MCNC: 3.4 G/DL — SIGNIFICANT CHANGE UP (ref 3.4–5)
ALP SERPL-CCNC: 73 U/L — SIGNIFICANT CHANGE UP (ref 40–120)
ALT FLD-CCNC: 34 U/L — SIGNIFICANT CHANGE UP (ref 12–42)
ANION GAP SERPL CALC-SCNC: 10 MMOL/L — SIGNIFICANT CHANGE UP (ref 9–16)
APTT BLD: 27 SEC — SIGNIFICANT CHANGE UP (ref 26.1–36.8)
AST SERPL-CCNC: 22 U/L — SIGNIFICANT CHANGE UP (ref 15–37)
BASOPHILS # BLD AUTO: 0.04 K/UL — SIGNIFICANT CHANGE UP (ref 0–0.2)
BASOPHILS NFR BLD AUTO: 0.6 % — SIGNIFICANT CHANGE UP (ref 0–2)
BILIRUB SERPL-MCNC: 0.4 MG/DL — SIGNIFICANT CHANGE UP (ref 0.2–1.2)
BUN SERPL-MCNC: 16 MG/DL — SIGNIFICANT CHANGE UP (ref 7–23)
CALCIUM SERPL-MCNC: 8.8 MG/DL — SIGNIFICANT CHANGE UP (ref 8.5–10.5)
CHLORIDE SERPL-SCNC: 103 MMOL/L — SIGNIFICANT CHANGE UP (ref 96–108)
CO2 SERPL-SCNC: 26 MMOL/L — SIGNIFICANT CHANGE UP (ref 22–31)
CREAT SERPL-MCNC: 0.91 MG/DL — SIGNIFICANT CHANGE UP (ref 0.5–1.3)
EGFR: 116 ML/MIN/1.73M2 — SIGNIFICANT CHANGE UP
EGFR: 116 ML/MIN/1.73M2 — SIGNIFICANT CHANGE UP
EOSINOPHIL # BLD AUTO: 0.12 K/UL — SIGNIFICANT CHANGE UP (ref 0–0.5)
EOSINOPHIL NFR BLD AUTO: 1.7 % — SIGNIFICANT CHANGE UP (ref 0–6)
GLUCOSE SERPL-MCNC: 106 MG/DL — HIGH (ref 70–99)
HCT VFR BLD CALC: 36.3 % — LOW (ref 39–50)
HGB BLD-MCNC: 11.9 G/DL — LOW (ref 13–17)
IMM GRANULOCYTES # BLD AUTO: 0.02 K/UL — SIGNIFICANT CHANGE UP (ref 0–0.07)
IMM GRANULOCYTES NFR BLD AUTO: 0.3 % — SIGNIFICANT CHANGE UP (ref 0–0.9)
INR BLD: 1.07 — SIGNIFICANT CHANGE UP (ref 0.85–1.16)
LACTATE BLDV-MCNC: 1.8 MMOL/L — SIGNIFICANT CHANGE UP (ref 0.5–2)
LIDOCAIN IGE QN: 40 U/L — SIGNIFICANT CHANGE UP (ref 16–77)
LYMPHOCYTES # BLD AUTO: 1.8 K/UL — SIGNIFICANT CHANGE UP (ref 1–3.3)
LYMPHOCYTES NFR BLD AUTO: 25.5 % — SIGNIFICANT CHANGE UP (ref 13–44)
MCHC RBC-ENTMCNC: 25.3 PG — LOW (ref 27–34)
MCHC RBC-ENTMCNC: 32.8 G/DL — SIGNIFICANT CHANGE UP (ref 32–36)
MCV RBC AUTO: 77.2 FL — LOW (ref 80–100)
MONOCYTES # BLD AUTO: 0.63 K/UL — SIGNIFICANT CHANGE UP (ref 0–0.9)
MONOCYTES NFR BLD AUTO: 8.9 % — SIGNIFICANT CHANGE UP (ref 2–14)
NEUTROPHILS # BLD AUTO: 4.46 K/UL — SIGNIFICANT CHANGE UP (ref 1.8–7.4)
NEUTROPHILS NFR BLD AUTO: 63 % — SIGNIFICANT CHANGE UP (ref 43–77)
NRBC # BLD AUTO: 0 K/UL — SIGNIFICANT CHANGE UP (ref 0–0)
NRBC # FLD: 0 K/UL — SIGNIFICANT CHANGE UP (ref 0–0)
NRBC BLD AUTO-RTO: 0 /100 WBCS — SIGNIFICANT CHANGE UP (ref 0–0)
PLATELET # BLD AUTO: 304 K/UL — SIGNIFICANT CHANGE UP (ref 150–400)
PMV BLD: 9.6 FL — SIGNIFICANT CHANGE UP (ref 7–13)
POTASSIUM SERPL-MCNC: 3.6 MMOL/L — SIGNIFICANT CHANGE UP (ref 3.5–5.3)
POTASSIUM SERPL-SCNC: 3.6 MMOL/L — SIGNIFICANT CHANGE UP (ref 3.5–5.3)
PROT SERPL-MCNC: 7 G/DL — SIGNIFICANT CHANGE UP (ref 6.4–8.2)
PROTHROM AB SERPL-ACNC: 12.4 SEC — SIGNIFICANT CHANGE UP (ref 9.9–13.4)
RBC # BLD: 4.7 M/UL — SIGNIFICANT CHANGE UP (ref 4.2–5.8)
RBC # FLD: 14 % — SIGNIFICANT CHANGE UP (ref 10.3–14.5)
SODIUM SERPL-SCNC: 139 MMOL/L — SIGNIFICANT CHANGE UP (ref 132–145)
WBC # BLD: 7.07 K/UL — SIGNIFICANT CHANGE UP (ref 3.8–10.5)
WBC # FLD AUTO: 7.07 K/UL — SIGNIFICANT CHANGE UP (ref 3.8–10.5)

## 2025-06-11 PROCEDURE — 99223 1ST HOSP IP/OBS HIGH 75: CPT

## 2025-06-11 PROCEDURE — 99053 MED SERV 10PM-8AM 24 HR FAC: CPT

## 2025-06-11 RX ORDER — ONDANSETRON HCL/PF 4 MG/2 ML
4 VIAL (ML) INJECTION ONCE
Refills: 0 | Status: COMPLETED | OUTPATIENT
Start: 2025-06-11 | End: 2025-06-11

## 2025-06-12 PROCEDURE — 74177 CT ABD & PELVIS W/CONTRAST: CPT | Mod: 26

## 2025-06-12 RX ORDER — ACETAMINOPHEN 500 MG/5ML
2 LIQUID (ML) ORAL
Qty: 40 | Refills: 0
Start: 2025-06-12 | End: 2025-06-16

## 2025-06-12 RX ORDER — MAGNESIUM CITRATE
300 SOLUTION, ORAL ORAL
Qty: 300 | Refills: 0
Start: 2025-06-12 | End: 2025-06-12

## 2025-06-12 RX ORDER — ACETAMINOPHEN 500 MG/5ML
1000 LIQUID (ML) ORAL ONCE
Refills: 0 | Status: COMPLETED | OUTPATIENT
Start: 2025-06-12 | End: 2025-06-12

## 2025-06-12 RX ORDER — POLYETHYLENE GLYCOL 3350 17 G/17G
17 POWDER, FOR SOLUTION ORAL ONCE
Refills: 0 | Status: COMPLETED | OUTPATIENT
Start: 2025-06-12 | End: 2025-06-12

## 2025-06-18 ENCOUNTER — APPOINTMENT (OUTPATIENT)
Dept: BARIATRICS | Facility: CLINIC | Age: 30
End: 2025-06-18
Payer: MEDICAID

## 2025-06-18 ENCOUNTER — NON-APPOINTMENT (OUTPATIENT)
Age: 30
End: 2025-06-18

## 2025-06-18 VITALS
OXYGEN SATURATION: 98 % | HEIGHT: 71 IN | DIASTOLIC BLOOD PRESSURE: 79 MMHG | TEMPERATURE: 97.4 F | SYSTOLIC BLOOD PRESSURE: 119 MMHG | BODY MASS INDEX: 30.24 KG/M2 | WEIGHT: 216 LBS | HEART RATE: 78 BPM

## 2025-06-18 PROCEDURE — 99024 POSTOP FOLLOW-UP VISIT: CPT
